# Patient Record
Sex: FEMALE | Race: ASIAN | Employment: FULL TIME | URBAN - METROPOLITAN AREA
[De-identification: names, ages, dates, MRNs, and addresses within clinical notes are randomized per-mention and may not be internally consistent; named-entity substitution may affect disease eponyms.]

---

## 2017-01-09 ENCOUNTER — TELEPHONE (OUTPATIENT)
Dept: GYNECOLOGY | Age: 47
End: 2017-01-09

## 2017-01-09 NOTE — TELEPHONE ENCOUNTER
Pt states that her disability co called and she was to return to work today, she states she has a second opinion on 1/12/17 at [facility] and then an appt with dr. Stella Pang to discuss after, we will put her return to work as of the end of the month, she will call them back and they will fax over necessary paperwork

## 2017-01-17 ENCOUNTER — TELEPHONE (OUTPATIENT)
Dept: SURGERY | Age: 47
End: 2017-01-17

## 2017-01-17 ENCOUNTER — HOSPITAL ENCOUNTER (OUTPATIENT)
Dept: INFUSION THERAPY | Age: 47
Discharge: HOME | End: 2017-01-17
Payer: COMMERCIAL

## 2017-01-17 VITALS
TEMPERATURE: 97.9 F | DIASTOLIC BLOOD PRESSURE: 75 MMHG | SYSTOLIC BLOOD PRESSURE: 112 MMHG | RESPIRATION RATE: 18 BRPM | HEART RATE: 89 BPM | OXYGEN SATURATION: 97 %

## 2017-01-17 PROCEDURE — 36591 DRAW BLOOD OFF VENOUS DEVICE: CPT

## 2017-01-17 PROCEDURE — 36415 COLL VENOUS BLD VENIPUNCTURE: CPT | Performed by: OBSTETRICS & GYNECOLOGY

## 2017-01-17 PROCEDURE — 86304 IMMUNOASSAY TUMOR CA 125: CPT | Performed by: OBSTETRICS & GYNECOLOGY

## 2017-01-17 PROCEDURE — 74011250636 HC RX REV CODE- 250/636: Performed by: OBSTETRICS & GYNECOLOGY

## 2017-01-17 PROCEDURE — 77030012965 HC NDL HUBR BBMI -A

## 2017-01-17 RX ORDER — HEPARIN 100 UNIT/ML
500 SYRINGE INTRAVENOUS AS NEEDED
Status: ACTIVE | OUTPATIENT
Start: 2017-01-17 | End: 2017-01-18

## 2017-01-17 RX ORDER — SODIUM CHLORIDE 0.9 % (FLUSH) 0.9 %
10 SYRINGE (ML) INJECTION AS NEEDED
Status: ACTIVE | OUTPATIENT
Start: 2017-01-17 | End: 2017-01-18

## 2017-01-17 RX ORDER — SODIUM CHLORIDE 9 MG/ML
10 INJECTION INTRAMUSCULAR; INTRAVENOUS; SUBCUTANEOUS AS NEEDED
Status: ACTIVE | OUTPATIENT
Start: 2017-01-17 | End: 2017-01-18

## 2017-01-17 RX ADMIN — SODIUM CHLORIDE, PRESERVATIVE FREE 500 UNITS: 5 INJECTION INTRAVENOUS at 14:01

## 2017-01-17 RX ADMIN — Medication 10 ML: at 14:02

## 2017-01-17 NOTE — PROGRESS NOTES
1345 Pt arrived ambulatory and in no distress for port flush and labs. Port accessed and flushed with positive blood return. Patient Vitals for the past 12 hrs:   Temp Pulse Resp BP SpO2   01/17/17 1348 97.9 °F (36.6 °C) 89 18 112/75 97 %     Medications:  Heparin Flush    1400 Pt discharged ambulatory and in no distress, tolerated treatment well. Next appointmt 3/14/17 at 1:30.

## 2017-01-18 LAB — CANCER AG125 SERPL-ACNC: 6 U/ML (ref 0–30)

## 2017-01-24 ENCOUNTER — OFFICE VISIT (OUTPATIENT)
Dept: GYNECOLOGY | Age: 47
End: 2017-01-24

## 2017-01-24 VITALS
WEIGHT: 143.4 LBS | BODY MASS INDEX: 28.16 KG/M2 | SYSTOLIC BLOOD PRESSURE: 98 MMHG | HEIGHT: 60 IN | DIASTOLIC BLOOD PRESSURE: 74 MMHG | HEART RATE: 96 BPM

## 2017-01-24 DIAGNOSIS — C80.0 CARCINOMATOSIS (HCC): ICD-10-CM

## 2017-01-24 DIAGNOSIS — C57.02 FALLOPIAN TUBE CANCER, CARCINOMA, LEFT (HCC): Primary | ICD-10-CM

## 2017-01-24 NOTE — PROGRESS NOTES
[facility]COLOGY  [address] [facility], [address]  [phone]  Dr. Jessica Jett, III    Tonny Bhardwaj, APRKER    Chemotherapy Office Note  Patient ID:  Name: Darcie Low  MRM: [id number]  : 1970/46 y.o. Date: 2017    Diagnosis:     ICD-10-CM ICD-9-CM    1. 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 Carcinomatosis ([address] C80.0 199.0        Problem List:   Patient Active Problem List   Diagnosis Code    Uterine fibroid D25.9    AR (allergic rhinitis) J30.9    Asthma J45.909    Family history of diabetes mellitus (DM) Z83.3    Other activity E65    Hypercholesterolemia E78.00    S/P laparotomy Z98.890    Carcinomatosis ([address] C80.0    Fallopian tube cancer, carcinoma ([address] C57.00    Chemotherapy induced diarrhea K52.1, T45.1X5A    Anemia associated with chemotherapy D64.81, T45.1X5A    Peritoneal adhesions K66.0    Pelvic adhesions N73.6    Incisional hernia K43.2    Ovarian ca ([address] C56.9       SUBJECTIVE:    Darcie Low is a 55 y.o. female who presents for followup care following staging laparotomy, BSO, extensive DEVON, 2016,        FINAL PATHOLOGIC DIAGNOSIS   1. Omentum, omentectomy:   Benign omentum with focal fibrosis   2. Small bowel, biopsy:   Benign fibrovascular tissue compatible with adhesion   3. Ovary, left oophorectomy:   Serous adenocarcinoma, high-grade   See comment   4. Ovary, right oophorectomy:   Benign ovary with hemorrhagic cyst and serosal adhesions The patient's pathology revealed       Currently she has no problems with eating, bowel movements, voiding, or their wound  Appetite is good. Eating a regular diet without difficulty. Bowel movements are regular. The patient is not having any pain. Per Stack Recent second opinions with RYAN and Indira Coker. Recommend two additional courses of IV Carbo/Taxotere---no records available.   Consideration of clinical trails discussed at consultations. Medications:     Current Outpatient Prescriptions on File Prior to Visit   Medication Sig Dispense Refill    ergocalciferol (VITAMIN D2) 50,000 unit capsule Take 1 Cap by mouth every seven (7) days. 52 Cap 0    gabapentin (NEURONTIN) 100 mg capsule Take 100 mg by mouth as needed. Indications: Restless Legs Syndrome      b complex vitamins (B COMPLEX 1) tablet Take 1 Tab by mouth daily.  glucose blood VI test strips (ACCU-CHEK REYNA PLUS TEST STRP) strip Use four times a day and PRN. 100 Strip 5    glucose blood VI test strips (ACCU-CHEK ACTIVE TEST) strip Use QID and  Strip 5    glucose blood VI test strips (ASCENSIA AUTODISC VI, ONE TOUCH ULTRA TEST VI) strip Glucose one touch test strips 100 Strip 11    Lancets misc Use as directed. 1 Each 11    CRANIAL PROSTHESIS misc Cranial Prosthesis  Diagnosis code  Cpt code 1 Each 2    metFORMIN ER (GLUCOPHAGE XR) 750 mg tablet Take 750 mg by mouth nightly.  ondansetron (ZOFRAN ODT) 4 mg disintegrating tablet TAKE 1 TABLET BY MOUTH EVERY 8 HOURS AS NEEDED FOR NAUSEA  3    oxyCODONE IR (ROXICODONE) 5 mg immediate release tablet TAKE 1 TABLET BY MOUTH EVERY 4 HOURS AS NEEDED FOR PAIN  0    oxyCODONE IR (ROXICODONE) 10 mg tab immediate release tablet Take 0.5 Tabs by mouth every four (4) hours as needed. Max Daily Amount: 30 mg. 40 Tab 0    lidocaine-prilocaine (EMLA) topical cream Apply small amount over port area and cover with band aid 1 hour before chemo 30 g 2     No current facility-administered medications on file prior to visit. Allergies: Allergies   Allergen Reactions    Tylenol [Acetaminophen] Swelling     Tylenol pm only  SLURRED SPEECH.      Past Medical History   Diagnosis Date    AR (allergic rhinitis) 4/21/2010    Asthma 4/21/2010     ALLERGY RELATED; NONE USED SINCE 2010    Cancer [facility])      OVARIAN AND FALLOPIAN TUBE    DM mellitus, gestational 4/21/2010     PRE DIABETIC    Elevated TSH 2010    Family history of diabetes mellitus (DM)     Hypercholesterolemia 2011     elevated particle number    Ill-defined condition 16     HYDRONEPHROSIS, LEFT RENAL MASS (BEING WORKED UP BY DR MC [place], UROL.)    Impaired glucose tolerance 2011    Other activity      BCG vaccine as a child    S/P laparotomy 2016    Uterine fibroid 2010     ENDOMETRIOSIS    Vitamin D deficiency 2010     Past Surgical History   Procedure Laterality Date    Hx wisdom teeth extraction       age 26    Hx other surgical       small bowel obstruction, ILEUS POSTOP    Hx appendectomy      Pr abdomen surgery proc unlisted  2016     EXP LAP FOR BOWEL OBST.  Hx vascular access       PORT A CATH L CHEST WALL    Hx  section  ,     Hx gyn  16     hysterectomy, TUBES REMOVED  (DR Nii Rodgers)     Social History     Social History    Marital status:      Spouse name: N/A    Number of children: N/A    Years of education: N/A     Occupational History    Not on file. Social History Main Topics    Smoking status: Never Smoker    Smokeless tobacco: Never Used    Alcohol use No    Drug use: No    Sexual activity: Yes     Partners: Male     Other Topics Concern    Not on file     Social History Narrative       OBJECTIVE:    Vitals:   Vitals:    17 1505   BP: 98/74   Pulse: 96   Weight: 143 lb 6.4 oz (65 kg)   Height: 5' (1.524 m)     Physical Examination: Last office examination     General:  no distress   Lungs: lungs clear to auscultation   Cardiac: Regular rate and rhythm   Abdomen: soft, bowel sounds active, non-tender  No CVA tenderness   Incision: healing well   Pelvic: Exam deferred. Rectal: not done   Extremity:   extremities normal, atraumatic, no cyanosis or edema     IMPRESSION:    Soraya Gibson is doing well postoperatively. .  She has a working diagnosis of     ICD-10-CM ICD-9-CM    1.  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 Carcinomatosis ([address] C80.0 199.0     Microscopic residual disease following IV Carbo/Taxol. Persistent disease    Medical problems:     Patient Active Problem List   Diagnosis Code    Uterine fibroid D25.9    AR (allergic rhinitis) J30.9    Asthma J45.909    Family history of diabetes mellitus (DM) Z83.3    Other activity E65    Hypercholesterolemia E78.00    S/P laparotomy Z98.890    Carcinomatosis ([address] C80.0    Fallopian tube cancer, carcinoma ([address] C57.00    Chemotherapy induced diarrhea K52.1, T45.1X5A    Anemia associated with chemotherapy D64.81, T45.1X5A    Peritoneal adhesions K66.0    Pelvic adhesions N73.6    Incisional hernia K43.2    Ovarian ca ([address] C56.9       PLAN:  Initial Consultation  The operative procedures and clinical results have been reviewed with the patient. Implications of diagnosis discussed at length. All questions answered. I have recommended salvage IV Doxil/Avastin. Rationale, risks and toxicities noted    I do not agree with the continuation of the IV Carbo/Taxol. Clinical trials are limited due to the lack of measurable disease. The patient and her  have suggestive transfer of care to [facility]. Accepted. Shari Zamora MD  1/24/2017/12:09 PM    Defined Sensitive Document    CC:   Carl Pacheco MD   No ref.  provider found

## 2017-03-06 ENCOUNTER — OFFICE VISIT (OUTPATIENT)
Dept: INTERNAL MEDICINE CLINIC | Age: 47
End: 2017-03-06

## 2017-03-06 VITALS
DIASTOLIC BLOOD PRESSURE: 80 MMHG | SYSTOLIC BLOOD PRESSURE: 102 MMHG | OXYGEN SATURATION: 99 % | HEART RATE: 100 BPM | HEIGHT: 60 IN | TEMPERATURE: 98.2 F | WEIGHT: 143 LBS | RESPIRATION RATE: 16 BRPM | BODY MASS INDEX: 28.07 KG/M2

## 2017-03-06 DIAGNOSIS — K64.4 EXTERNAL HEMORRHOIDS: ICD-10-CM

## 2017-03-06 DIAGNOSIS — L08.9 SKIN INFECTION: ICD-10-CM

## 2017-03-06 DIAGNOSIS — T23.171A: Primary | ICD-10-CM

## 2017-03-06 RX ORDER — AMOXICILLIN 875 MG/1
875 TABLET, FILM COATED ORAL 2 TIMES DAILY
Qty: 14 TAB | Refills: 0 | Status: SHIPPED | OUTPATIENT
Start: 2017-03-06 | End: 2017-08-25 | Stop reason: ALTCHOICE

## 2017-03-06 NOTE — MR AVS SNAPSHOT
Visit Information Date & Time Provider Department Dept. Phone Encounter #  
 3/6/2017  2:20 PM Jadiel San NP [facility] Internal Medicine [phone] Upcoming Health Maintenance Date Due Pneumococcal 19-64 Highest Risk (1 of 3 - PCV13) 5/30/1989 PAP AKA CERVICAL CYTOLOGY 5/30/1991 DTaP/Tdap/Td series (2 - Td) 9/7/2020 Allergies as of 3/6/2017  Review Complete On: 3/6/2017 By: Jadiel San NP Severity Noted Reaction Type Reactions Tylenol [Acetaminophen]  04/25/2016    Swelling Tylenol pm only SLURRED SPEECH. Current Immunizations  Reviewed on 1/17/2017 Name Date Hepatitis B Vaccine 9/21/2010 PPD 1/4/2012 TDAP Vaccine 9/7/2010 Not reviewed this visit You Were Diagnosed With   
  
 Codes Comments Superficial burn of wrist, right, initial encounter    -  Primary ICD-10-CM: T23.171A ICD-9-CM: 944.17 Skin infection     ICD-10-CM: L08.9 ICD-9-CM: 686.9 External hemorrhoids     ICD-10-CM: K64.4 ICD-9-CM: 461. 3 Vitals BP Pulse Temp Resp Height(growth percentile) Weight(growth percentile) 102/80 (BP 1 Location: Left arm, BP Patient Position: Sitting) 100 98.2 °F (36.8 °C) (Oral) 16 5' (1.524 m) 143 lb (64.9 kg) LMP SpO2 BMI OB Status Smoking Status 07/05/2013 99% 27.93 kg/m2 Hysterectomy Never Smoker BMI and BSA Data Body Mass Index Body Surface Area  
 27.93 kg/m 2 1.66 m 2 Preferred Pharmacy Pharmacy Name Phone [id number] [place] [address] [phone] Your Updated Medication List  
  
   
This list is accurate as of: 3/6/17  3:20 PM.  Always use your most recent med list.  
  
  
  
  
 amoxicillin 875 mg tablet Commonly known as:  AMOXIL Take 1 Tab by mouth two (2) times a day. B COMPLEX 1 tablet Generic drug:  b complex vitamins Take 1 Tab by mouth daily. CRANIAL PROSTHESIS Misc Cranial Prosthesis Diagnosis code Cpt code  
  
 ergocalciferol 50,000 unit capsule Commonly known as:  VITAMIN D2 Take 1 Cap by mouth every seven (7) days. * glucose blood VI test strips strip Commonly known as:  ASCENSIA AUTODISC VI, ONE TOUCH ULTRA TEST VI  
Glucose one touch test strips * glucose blood VI test strips strip Commonly known as:  ACCU-CHEK ACTIVE TEST Use QID and PRN  
  
 * glucose blood VI test strips strip Commonly known as:  ACCU-CHEK REYNA PLUS TEST STRP Use four times a day and PRN. Lancets Misc Use as directed. lidocaine-prilocaine topical cream  
Commonly known as:  EMLA Apply small amount over port area and cover with band aid 1 hour before chemo NEURONTIN 100 mg capsule Generic drug:  gabapentin Take 100 mg by mouth as needed. Indications: Restless Legs Syndrome * Notice: This list has 3 medication(s) that are the same as other medications prescribed for you. Read the directions carefully, and ask your doctor or other care provider to review them with you. Prescriptions Sent to Pharmacy Refills  
 amoxicillin (AMOXIL) 875 mg tablet 0 Sig: Take 1 Tab by mouth two (2) times a day. Class: Normal  
 Pharmacy: [id number] [place], [address] [place] [address] #: [phone] Route: Oral  
  
To-Do List   
 03/14/2017 1:30 PM  
  Appointment with Binta Wilkinson at [facility] ([phone])  
  
 05/09/2017 1:30 PM  
  Appointment with Binta Wilkinson at [facility] ([phone]) Patient Instructions Common at-home treatments for hemorrhoids Type of medicine Examples Notes Stool softener Docusate sodium (sample brand names: Colace, Docu, Stool Softener) Softens bowel movements Helps avoid straining while sitting on toilet Bulk-forming laxatives and fiber supplements Methylcellulose (sample brand names: Citrucel, Soluble Fiber Therapy) Polycarbophil (sample brand names: Jaden Bengali Fiber) Psyllium (sample brand names: Pavithra Jn) Wheat dextrin (sample brand name: Benefiber) Prevent hard dry stools which make hemorrhoids worse Might reduce bleeding and other hemorrhoid symptoms Needs to be taken with plenty of water (8 glasses of water a day) Your doctor might suggest starting with a small dose and then increasing over time Pain relief Pramoxine rectal foam, ointment, or wipes (sample brand names: Proctofoam, Pramox) Can help with pain and itching Area must be gently cleaned and allowed to dry before using Medicines to dry or protect skin Witch hazel (sample brand names: Tucks, Preparation H pads, Preparation H wipes) Zinc oxide topical paste (sample brand names: Renetta's Butt Paste, Desitin) May dry or tighten skin around the anus Zinc oxide also protects skin from irritation Area must be gently cleaned and allowed to dry before using Can apply after a sitz bath (soaking in warm, shallow water) Witch hazel wipes or unscented baby wipes can be used to clean the anus after a bowel movement Steroid cream Hydrocortisone rectal cream (sample brand name: Preparation H hydrocortisone) Reduces swelling, and pain caused by hemorrhoids Do not use for longer than a week Do not use at all if you are pregnant unless your doctor or nurse tells you to Medicines to shrink hemorrhoids Phenylephrine ointment or suppository (sample brand names: Preparation H, Rectacaine) Phenylephrine shrinks swollen hemorrhoids, and relieves itching and discomfort for a few hours Area must be gently cleaned and allowed to dry before applying Numbing ointments Benzocaine rectal ointment (sample brand name: Americaine) Dibucaine rectal ointment (sample brand name: Susan Bloch) Lidocaine rectal cream (sample brand name: RectiCare) Benzocaine, dibucaine, and lidocaine can help with pain and itching, but should not be used without talking to a doctor first. They should only be used once in a while, in small amounts. · This table lists some examples of over-the-counter treatments for hemorrhoids. There are many more brand-name and generic versions available, too. Read all labels to make sure you're not using too much of one ingredient. · Do not use over-the-counter treatments for more than one week without speaking to your doctor. They can damage your skin. · Follow instructions carefully  for example, it's important to clean and dry your skin before using creams or ointments. You can use an unscented baby wipe to clean your anus after a bowel movement. · If you have rectal bleeding, or if your bowel movements look like tar, see your doctor or nurse. These problems could be caused by something other than hemorrhoids. You should also see your doctor or nurse if your hemorrhoid symptoms still bother you after you have tried taking care of them yourself. Introducing [facility]! Dear Raza Hopkins: 
Thank you for requesting a [employer] [employer] account. Our records indicate that you already have an active [employer] account. You can access your account anytime at [email or web address] Did you know that you can access your hospital and ER discharge instructions at any time in [employer]? You can also review all of your test results from your hospital stay or ER visit. Additional Information If you have questions, please visit the Frequently Asked Questions section of the [employer] website at [email or web address] Remember, [facility] is NOT to be used for urgent needs. For medical emergencies, dial 911. Now available from your iPhone and Android! Please provide this summary of care documentation to your next provider. Your primary care clinician is listed as Jaime Carpio.  If you have any questions after today's visit, please call [phone].

## 2017-03-06 NOTE — PROGRESS NOTES
Chief Complaint   Patient presents with    Fever     was 89. patient went on the 22nd for last round of chemo. states that she has places on both hands and on the feet. nurse told patient to use udder cream and patient is not seeing Dr Emiliano Ng, is going to [address] [place] [address].

## 2017-03-06 NOTE — PROGRESS NOTES
Medina Jarvis is a  55 y.o. female presents for visit. Chief Complaint   Patient presents with    Fever     here for burn on right wrist.       HPI   Patient new to me. Recent chemotherapy experiencing side effects including rash on hands/feet and diarrhea that is being managed by oncologist. PMhx of rectal itching and external hemmorhoid. Reports rectal itching after diarrhea this time. Worse  No bleeding, no bloody stool. Presents with burn on right wrist that was sustained 3 weeks ago while frying with oil. [place] Has not healed yet. Tender to touch and scant clear drainage. Fever of 10.2 last night. Treating with neosporin but ineffective.      ROS   Per HPI    Patient Active Problem List    Diagnosis Date Noted    Peritoneal adhesions 11/22/2016    Pelvic adhesions 11/22/2016    Incisional hernia 11/22/2016    Ovarian ca ([address] 11/22/2016    Chemotherapy induced diarrhea 09/20/2016    Anemia associated with chemotherapy 09/20/2016    Fallopian tube cancer, carcinoma ([address] 08/09/2016    Carcinomatosis ([address] 06/24/2016    S/P laparotomy 05/23/2016    Other activity     Family history of diabetes mellitus (DM)     Hypercholesterolemia 11/01/2011    Uterine fibroid 04/21/2010    AR (allergic rhinitis) 04/21/2010    Asthma 04/21/2010     Past Medical History:   Diagnosis Date    AR (allergic rhinitis) 4/21/2010    Asthma 4/21/2010    ALLERGY RELATED; NONE USED SINCE 2010    Cancer [facility])     OVARIAN AND FALLOPIAN TUBE    DM mellitus, gestational 4/21/2010    PRE DIABETIC    Elevated TSH 4/21/2010    Family history of diabetes mellitus (DM)     Hypercholesterolemia 11/2011    elevated particle number    Ill-defined condition 6/16/16    HYDRONEPHROSIS, LEFT RENAL MASS (BEING WORKED UP BY DR MC Hubbard, UROL.)    Impaired glucose tolerance 12/19/2011    Other activity     BCG vaccine as a child    S/P laparotomy 5/23/2016    Uterine fibroid 4/21/2010    ENDOMETRIOSIS    Vitamin D deficiency 2010      Past Surgical History:   Procedure Laterality Date    ABDOMEN SURGERY PROC UNLISTED  2016    EXP LAP FOR BOWEL OBST.  HX APPENDECTOMY      HX  SECTION  ,     HX GYN  16    hysterectomy, TUBES REMOVED  ([facility])    HX OTHER SURGICAL      small bowel obstruction, ILEUS POSTOP    HX VASCULAR ACCESS      PORT A CATH L CHEST WALL    HX WISDOM TEETH EXTRACTION      age 28        Social History   Substance Use Topics    Smoking status: Never Smoker    Smokeless tobacco: Never Used    Alcohol use No      Social History     Social History Narrative     Family History   Problem Relation Age of Onset    Diabetes Mother     Diabetes Father     Hypertension Father     Diabetes Maternal Grandmother     Diabetes Maternal Grandfather     No Known Problems Sister     Diabetes Paternal Uncle     Anesth Problems Neg Hx       Prior to Admission medications    Medication Sig Start Date End Date Taking? Authorizing Provider   amoxicillin (AMOXIL) 875 mg tablet Take 1 Tab by mouth two (2) times a day. 3/6/17  Yes Rene Espino NP   ergocalciferol (VITAMIN D2) 50,000 unit capsule Take 1 Cap by mouth every seven (7) days. 16  Yes Jose E Croft NP   gabapentin (NEURONTIN) 100 mg capsule Take 100 mg by mouth as needed. Indications: Restless Legs Syndrome   Yes Historical Provider   b complex vitamins (B COMPLEX 1) tablet Take 1 Tab by mouth daily. Yes Historical Provider   glucose blood VI test strips (ACCU-CHEK REYNA PLUS TEST STRP) strip Use four times a day and PRN. 16  Yes Jen Huffman MD   glucose blood VI test strips (ACCU-CHEK ACTIVE TEST) strip Use QID and PRN 16  Yes Jen Huffman MD   glucose blood VI test strips (ASCENSIA AUTODISC VI, ONE TOUCH ULTRA TEST VI) strip Glucose one touch test strips 16  Yes Jen Huffman MD   Lancets misc Use as directed.  16  Yes Jen Huffman MD   CRANIAL PROSTHESIS [facility] Cranial Prosthesis  Diagnosis code  Cpt code 7/14/16  Yes Vidal Cronin III, MD   lidocaine-prilocaine (EMLA) topical cream Apply small amount over port area and cover with band aid 1 hour before chemo 6/24/16  Yes Elder Ochoa MD      Allergies   Allergen Reactions    Tylenol [Acetaminophen] Swelling     Tylenol pm only  SLURRED SPEECH. Visit Vitals    /80 (BP 1 Location: Left arm, BP Patient Position: Sitting)    Pulse 100    Temp 98.2 °F (36.8 °C) (Oral)    Resp 16    Ht 5' (1.524 m)    Wt 143 lb (64.9 kg)    LMP 07/05/2013    SpO2 99%    BMI 27.93 kg/m2     Physical Exam   Constitutional: She is oriented to person, place, and time. No distress. Thin built    HENT:   Right Ear: External ear normal.   Left Ear: External ear normal.   Mouth/Throat: Oropharynx is clear and moist.   Eyes: Conjunctivae and EOM are normal.   Neck: Normal range of motion. Neck supple. Cardiovascular: Normal rate and regular rhythm. Pulmonary/Chest: Effort normal and breath sounds normal.   Abdominal: Soft. Bowel sounds are normal.   Neurological: She is alert and oriented to person, place, and time. Skin: Skin is dry and intact. There is erythema (patches or erythema noted to both palms related to side effect from chemo. ). Psychiatric: She has a normal mood and affect. Her behavior is normal.   Nursing note and vitals reviewed. This note will not be viewable in [address] ASSESSMENT AND PLAN:      ICD-10-CM ICD-9-CM    1. Superficial burn of wrist, right, initial encounter T23.171A 944.17    2. Skin infection L08.9 686.9 amoxicillin (AMOXIL) 875 mg tablet   3. External hemorrhoids K64.4 455. 3 Reviewed OTC treatment, prep H and annusol. Follow-up Disposition:  Return if symptoms worsen or fail to improve.         Disclaimer:  Advised her to call back or return to office if symptoms worsen/change/persist.  Discussed expected course/resolution/complications of diagnosis in detail with patient. Medication risks/benefits/alternatives discussed with patient. She was given an after visit summary which includes diagnoses, current medications, & vitals. Discussed patient instructions and advised to read to all patient instructions regarding care. She expressed understanding with the diagnosis and plan.

## 2017-03-06 NOTE — PATIENT INSTRUCTIONS
Common at-home treatments for hemorrhoids  Type of medicine Examples Notes   Stool softener Docusate sodium (sample brand names: Colace, Docu, Stool Softener) Softens bowel movements  Helps avoid straining while sitting on toilet   Bulk-forming laxatives and fiber supplements Methylcellulose (sample brand names: Citrucel, Soluble Fiber Therapy)  Polycarbophil (sample brand names: Carolyn Dials Fiber)  Psyllium (sample brand names: Metamucil, Konsyl)  Wheat dextrin (sample brand name: Benefiber) Prevent hard dry stools which make hemorrhoids worse  Might reduce bleeding and other hemorrhoid symptoms  Needs to be taken with plenty of water (8 glasses of water a day)  Your doctor might suggest starting with a small dose and then increasing over time   Pain relief Pramoxine rectal foam, ointment, or wipes (sample brand names: Proctofoam, Pramox) Can help with pain and itching  Area must be gently cleaned and allowed to dry before using   Medicines to dry or protect skin Witch hazel (sample brand names: Tucks, Preparation H pads, Preparation H wipes)  Zinc oxide topical paste (sample brand names: Renetta's Butt Paste, Desitin) May dry or tighten skin around the anus  Zinc oxide also protects skin from irritation  Area must be gently cleaned and allowed to dry before using  Can apply after a sitz bath (soaking in warm, shallow water)  Witch hazel wipes or unscented baby wipes can be used to clean the anus after a bowel movement   Steroid cream Hydrocortisone rectal cream (sample brand name: Preparation H hydrocortisone) Reduces swelling, and pain caused by hemorrhoids  Do not use for longer than a week  Do not use at all if you are pregnant unless your doctor or nurse tells you to   Medicines to shrink hemorrhoids Phenylephrine ointment or suppository (sample brand names: Preparation H, Rectacaine) Phenylephrine shrinks swollen hemorrhoids, and relieves itching and discomfort for a few hours  Area must be gently cleaned and allowed to dry before applying   Numbing ointments Benzocaine rectal ointment (sample brand name: Americaine)  Dibucaine rectal ointment (sample brand name: Nupercainal)  Lidocaine rectal cream (sample brand name: RectiCare) Benzocaine, dibucaine, and lidocaine can help with pain and itching, but should not be used without talking to a doctor first. They should only be used once in a while, in small amounts. · This table lists some examples of over-the-counter treatments for hemorrhoids. There are many more brand-name and generic versions available, too. Read all labels to make sure you're not using too much of one ingredient. · Do not use over-the-counter treatments for more than one week without speaking to your doctor. They can damage your skin. · Follow instructions carefully - for example, it's important to clean and dry your skin before using creams or ointments. You can use an unscented baby wipe to clean your anus after a bowel movement. · If you have rectal bleeding, or if your bowel movements look like tar, see your doctor or nurse. These problems could be caused by something other than hemorrhoids. You should also see your doctor or nurse if your hemorrhoid symptoms still bother you after you have tried taking care of them yourself.

## 2017-03-14 ENCOUNTER — DOCUMENTATION ONLY (OUTPATIENT)
Dept: GYNECOLOGY | Age: 47
End: 2017-03-14

## 2017-03-14 ENCOUNTER — APPOINTMENT (OUTPATIENT)
Dept: INFUSION THERAPY | Age: 47
End: 2017-03-14

## 2017-03-14 NOTE — PROGRESS NOTES
Port flush/lab appts for 3/14/17, and 5/9/17 cancelled with Joe Vila at [facility] d/t pt has transferred her care to [facility].

## 2017-05-09 ENCOUNTER — APPOINTMENT (OUTPATIENT)
Dept: INFUSION THERAPY | Age: 47
End: 2017-05-09

## 2017-07-31 RX ORDER — METFORMIN HYDROCHLORIDE 500 MG/1
500 TABLET, EXTENDED RELEASE ORAL
Qty: 90 TAB | Refills: 3 | Status: SHIPPED | OUTPATIENT
Start: 2017-07-31 | End: 2017-08-25 | Stop reason: DRUGHIGH

## 2017-08-22 DIAGNOSIS — Z00.00 PHYSICAL EXAM: Primary | ICD-10-CM

## 2017-08-23 LAB
25(OH)D3+25(OH)D2 SERPL-MCNC: 33.9 NG/ML (ref 30–100)
ALBUMIN SERPL-MCNC: 4 G/DL (ref 3.5–5.5)
ALBUMIN/GLOB SERPL: 1.2 {RATIO} (ref 1.2–2.2)
ALP SERPL-CCNC: 70 IU/L (ref 39–117)
ALT SERPL-CCNC: 89 IU/L (ref 0–32)
AST SERPL-CCNC: 55 IU/L (ref 0–40)
BILIRUB SERPL-MCNC: 0.5 MG/DL (ref 0–1.2)
BUN SERPL-MCNC: 8 MG/DL (ref 6–24)
BUN/CREAT SERPL: 13 (ref 9–23)
CALCIUM SERPL-MCNC: 9 MG/DL (ref 8.7–10.2)
CHLORIDE SERPL-SCNC: 103 MMOL/L (ref 96–106)
CHOLEST SERPL-MCNC: 127 MG/DL (ref 100–199)
CO2 SERPL-SCNC: 23 MMOL/L (ref 18–29)
CREAT SERPL-MCNC: 0.6 MG/DL (ref 0.57–1)
ERYTHROCYTE [DISTWIDTH] IN BLOOD BY AUTOMATED COUNT: 14.1 % (ref 12.3–15.4)
EST. AVERAGE GLUCOSE BLD GHB EST-MCNC: 131 MG/DL
GLOBULIN SER CALC-MCNC: 3.4 G/DL (ref 1.5–4.5)
GLUCOSE SERPL-MCNC: 116 MG/DL (ref 65–99)
HBA1C MFR BLD: 6.2 % (ref 4.8–5.6)
HCT VFR BLD AUTO: 34.6 % (ref 34–46.6)
HDLC SERPL-MCNC: 31 MG/DL
HGB BLD-MCNC: 11.4 G/DL (ref 11.1–15.9)
INTERPRETATION, 910389: NORMAL
LDLC SERPL CALC-MCNC: 71 MG/DL (ref 0–99)
MCH RBC QN AUTO: 30.9 PG (ref 26.6–33)
MCHC RBC AUTO-ENTMCNC: 32.9 G/DL (ref 31.5–35.7)
MCV RBC AUTO: 94 FL (ref 79–97)
PLATELET # BLD AUTO: 161 X10E3/UL (ref 150–379)
POTASSIUM SERPL-SCNC: 4.1 MMOL/L (ref 3.5–5.2)
PROT SERPL-MCNC: 7.4 G/DL (ref 6–8.5)
RBC # BLD AUTO: 3.69 X10E6/UL (ref 3.77–5.28)
SODIUM SERPL-SCNC: 141 MMOL/L (ref 134–144)
TRIGL SERPL-MCNC: 124 MG/DL (ref 0–149)
TSH SERPL DL<=0.005 MIU/L-ACNC: 0.82 UIU/ML (ref 0.45–4.5)
VLDLC SERPL CALC-MCNC: 25 MG/DL (ref 5–40)
WBC # BLD AUTO: 5 X10E3/UL (ref 3.4–10.8)

## 2017-08-25 ENCOUNTER — OFFICE VISIT (OUTPATIENT)
Dept: INTERNAL MEDICINE CLINIC | Age: 47
End: 2017-08-25

## 2017-08-25 VITALS
HEIGHT: 60 IN | DIASTOLIC BLOOD PRESSURE: 68 MMHG | SYSTOLIC BLOOD PRESSURE: 118 MMHG | TEMPERATURE: 97.8 F | HEART RATE: 85 BPM | OXYGEN SATURATION: 97 % | WEIGHT: 139.4 LBS | BODY MASS INDEX: 27.37 KG/M2

## 2017-08-25 DIAGNOSIS — Z12.39 BREAST CANCER SCREENING: ICD-10-CM

## 2017-08-25 DIAGNOSIS — Z85.43 HISTORY OF OVARIAN CANCER: ICD-10-CM

## 2017-08-25 DIAGNOSIS — G62.0 CHEMOTHERAPY-INDUCED PERIPHERAL NEUROPATHY (HCC): ICD-10-CM

## 2017-08-25 DIAGNOSIS — Z00.00 PHYSICAL EXAM: Primary | ICD-10-CM

## 2017-08-25 DIAGNOSIS — E11.9 CONTROLLED TYPE 2 DIABETES MELLITUS WITHOUT COMPLICATION, WITHOUT LONG-TERM CURRENT USE OF INSULIN (HCC): ICD-10-CM

## 2017-08-25 DIAGNOSIS — L50.2 HIVES DUE TO COLD EXPOSURE: ICD-10-CM

## 2017-08-25 DIAGNOSIS — T45.1X5A CHEMOTHERAPY-INDUCED PERIPHERAL NEUROPATHY (HCC): ICD-10-CM

## 2017-08-25 RX ORDER — METFORMIN HYDROCHLORIDE 750 MG/1
750 TABLET, EXTENDED RELEASE ORAL DAILY
Qty: 90 TAB | Refills: 0 | Status: SHIPPED | OUTPATIENT
Start: 2017-08-25 | End: 2017-10-13 | Stop reason: SDUPTHER

## 2017-08-25 RX ORDER — GABAPENTIN 300 MG/1
300 CAPSULE ORAL
Qty: 90 CAP | Refills: 0 | Status: SHIPPED | OUTPATIENT
Start: 2017-08-25 | End: 2017-11-07 | Stop reason: SDUPTHER

## 2017-08-25 NOTE — PROGRESS NOTES
Written by Tina Vergara, as dictated by Dr. Pedro Pablo Ricardo MD.    Francheska Farnsworth is a 52 y.o. female. HPI  The patient comes in today for a complete physical examination. She finished chemotherapy on 02/28 for ovarian cancer, and follows with Dr. Carli Vargas (onco) at [facility] every 3 months, referred by Dr. Trupti Santacruz (gynecology). She had a full hysterectomy in 02/2016 so she does not get pap smears. Labs were drawn on 08/22. Her HDL was low at 31 but all other cholesterols were within normal limits. Her fasting BS was elevated at 116 and her AST and ALT were elevated 55 and 89. Her HA1c was 6.2%, up from 5.1% in 11/2016. Her last mammogram was in 2016, which was normal. She has not gone for a repeat mammogram this year yet. She takes gabapentin for leg cramps, which are worst at night. She is vegetarian and takes vitamin B12 supplements. She is compliant on metformin  mg and she does follow a low-carb diet. She denies chest tightness or SOB. She has been experiencing localized hive outbreaks nearly every day, for which she has been taking Benadryl and applying coconut oil, which have not helped. The outbreaks occur when she is cold (opens the freezer) and sometimes with food, and her lips do occasionally swell as well.     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 52 Cap 0    b complex vitamins (B COMPLEX 1) tablet Take 1 Tab by mouth daily.  glucose blood VI test strips (ACCU-CHEK REYNA PLUS TEST STRP) strip Use four times a day and PRN. 100 Strip 5    glucose blood VI test strips (ACCU-CHEK ACTIVE TEST) strip Use QID and  Strip 5    glucose blood VI test strips (ASCENSIA AUTODISC VI, ONE TOUCH ULTRA TEST VI) strip Glucose one touch test strips 100 Strip 11    Lancets misc Use as directed. 1 Each 11    CRANIAL PROSTHESIS misc Cranial Prosthesis  Diagnosis code  Cpt code (Patient not taking: Reported on 8/25/2017) 1 Each 2    lidocaine-prilocaine (EMLA) topical cream Apply small amount over port area and cover with band aid 1 hour before chemo (Patient not taking: Reported on 8/25/2017) 30 g 2     No current facility-administered medications on file prior to visit. Allergies   Allergen Reactions    Tylenol [Acetaminophen] Swelling     Tylenol pm only  SLURRED SPEECH. Past Medical History:   Diagnosis Date    AR (allergic rhinitis) 4/21/2010    Asthma 4/21/2010    ALLERGY RELATED; NONE USED SINCE 2010    Cancer [facility])     OVARIAN AND FALLOPIAN TUBE    DM mellitus, gestational 4/21/2010    PRE DIABETIC    Elevated TSH 4/21/2010    Family history of diabetes mellitus (DM)     Hypercholesterolemia 11/2011    elevated particle number    Ill-defined condition 6/16/16    HYDRONEPHROSIS, LEFT RENAL MASS (BEING WORKED UP BY DR MC Flynn, UROL.)    Impaired glucose tolerance 12/19/2011    Other activity     BCG vaccine as a child    S/P laparotomy 5/23/2016    Uterine fibroid 4/21/2010    ENDOMETRIOSIS    Vitamin D deficiency 5/18/2010       Past Surgical History:   Procedure Laterality Date    ABDOMEN SURGERY PROC UNLISTED  05/03/2016    EXP LAP FOR BOWEL OBST.     HX APPENDECTOMY      [address] HX GYN  2/24/16 hysterectomy, TUBES REMOVED  ([facility])    HX OTHER SURGICAL  5/16    small bowel obstruction, ILEUS POSTOP    HX VASCULAR ACCESS      PORT A CATH L CHEST WALL    HX WISDOM TEETH EXTRACTION      age 28       Family History   Problem Relation Age of Onset    Diabetes Mother     Diabetes Father     Hypertension Father     Diabetes Maternal Grandmother     Diabetes Maternal Grandfather     No Known Problems Sister     Diabetes Paternal Uncle     Anesth Problems Neg Hx        Social History     Social History    Marital status:      Spouse name: N/A    Number of children: N/A    Years of education: N/A     Occupational History    Not on file.      Social History Main Topics    Smoking status: Never Smoker    Smokeless tobacco: Never Used    Alcohol use No    Drug use: No    Sexual activity: Yes     Partners: Male     Other Topics Concern    Not on file     Social History Narrative       Orders Only on 08/22/2017   Component Date Value Ref Range Status    Cholesterol, total 08/22/2017 127  100 - 199 mg/dL Final    Triglyceride 08/22/2017 124  0 - 149 mg/dL Final    HDL Cholesterol 08/22/2017 31* >39 mg/dL Final    VLDL, calculated 08/22/2017 25  5 - 40 mg/dL Final    LDL, calculated 08/22/2017 71  0 - 99 mg/dL Final    WBC 08/22/2017 5.0  3.4 - 10.8 x10E3/uL Final    RBC 08/22/2017 3.69* 3.77 - 5.28 x10E6/uL Final    HGB 08/22/2017 11.4  11.1 - 15.9 g/dL Final    HCT 08/22/2017 34.6  34.0 - 46.6 % Final    MCV 08/22/2017 94  79 - 97 fL Final    MCH 08/22/2017 30.9  26.6 - 33.0 pg Final    MCHC 08/22/2017 32.9  31.5 - 35.7 g/dL Final    RDW 08/22/2017 14.1  12.3 - 15.4 % Final    PLATELET 23/10/8805 829  150 - 379 x10E3/uL Final    Glucose 08/22/2017 116* 65 - 99 mg/dL Final    BUN 08/22/2017 8  6 - 24 mg/dL Final    Creatinine 08/22/2017 0.60  0.57 - 1.00 mg/dL Final    GFR est non-AA 08/22/2017 109  >59 mL/min/1.73 Final    GFR est AA 08/22/2017 126  >59 mL/min/1.73 Final    BUN/Creatinine ratio 08/22/2017 13  9 - 23 Final    Sodium 08/22/2017 141  134 - 144 mmol/L Final    Potassium 08/22/2017 4.1  3.5 - 5.2 mmol/L Final    Chloride 08/22/2017 103  96 - 106 mmol/L Final    CO2 08/22/2017 23  18 - 29 mmol/L Final    Calcium 08/22/2017 9.0  8.7 - 10.2 mg/dL Final    Protein, total 08/22/2017 7.4  6.0 - 8.5 g/dL Final    Albumin 08/22/2017 4.0  3.5 - 5.5 g/dL Final    GLOBULIN, TOTAL 08/22/2017 3.4  1.5 - 4.5 g/dL Final    A-G Ratio 08/22/2017 1.2  1.2 - 2.2 Final    Bilirubin, total 08/22/2017 0.5  0.0 - 1.2 mg/dL Final    Alk. phosphatase 08/22/2017 70  39 - 117 IU/L Final    AST (SGOT) 08/22/2017 55* 0 - 40 IU/L Final    ALT (SGPT) 08/22/2017 89* 0 - 32 IU/L Final    VITAMIN D, 25-HYDROXY 08/22/2017 33.9  30.0 - 100.0 ng/mL Final    Comment: Vitamin D deficiency has been defined by the [facility] practice guideline as a  level of serum 25-OH vitamin D less than 20 ng/mL (1,2). The Endocrine Society went on to further define vitamin D  insufficiency as a level between 21 and 29 ng/mL (2). 1. IOM (Rex of Medicine). 2010. Dietary reference     intakes for calcium and D. [facility]: The     [employer]. 2. Konstantin MF, Afsaneh NC, Nuno CALDERON, et al.     Evaluation, treatment, and prevention of vitamin D     deficiency: an Endocrine Society clinical practice     guideline. JCEM. 2011 Jul; 96(7):1911-30.  TSH 08/22/2017 0.819  0.450 - 4.500 uIU/mL Final    Hemoglobin A1c 08/22/2017 6.2* 4.8 - 5.6 % Final    Comment:          Pre-diabetes: 5.7 - 6.4           Diabetes: >6.4           Glycemic control for adults with diabetes: <7.0      Estimated average glucose 08/22/2017 131  mg/dL Final    INTERPRETATION 08/22/2017 Note   Final    Supplement report is available. Review of Systems   Constitutional: Negative for malaise/fatigue. HENT: Negative for congestion.     Eyes: Negative for blurred vision and pain. Respiratory: Negative for cough and shortness of breath. Cardiovascular: Negative for chest pain and palpitations. Gastrointestinal: Negative for abdominal pain and heartburn. Genitourinary: Negative for frequency and urgency. Musculoskeletal: Negative for joint pain and myalgias. Skin: Positive for rash. Neurological: Negative for dizziness, tingling, sensory change, weakness and headaches. Psychiatric/Behavioral: Negative for depression, memory loss and substance abuse. Visit Vitals    /68 (BP 1 Location: Right arm, BP Patient Position: Sitting)    Pulse 85    Temp 97.8 °F (36.6 °C) (Oral)    Ht 5' (1.524 m)    Wt 139 lb 6.4 oz (63.2 kg)    LMP 07/05/2013    SpO2 97%    BMI 27.22 kg/m2       Physical Exam   Constitutional: She is oriented to person, place, and time. She appears well-developed and well-nourished. No distress. HENT:   Right Ear: External ear normal.   Left Ear: External ear normal.   Eyes: Conjunctivae and EOM are normal. Right eye exhibits no discharge. Left eye exhibits no discharge. Neck: Normal range of motion. Neck supple. Cardiovascular: Normal rate and regular rhythm. Pulses:       Dorsalis pedis pulses are 2+ on the right side, and 2+ on the left side. Pulmonary/Chest: Effort normal and breath sounds normal. She has no wheezes. Abdominal: Soft. Bowel sounds are normal. There is no tenderness. Lymphadenopathy:     She has no cervical adenopathy. Neurological: She is alert and oriented to person, place, and time. Skin: She is not diaphoretic. Psychiatric: She has a normal mood and affect. Her behavior is normal.   Nursing note and vitals reviewed. ASSESSMENT and PLAN    ICD-10-CM ICD-9-CM    1. Physical exam Z00.00 V70.9 Complete physical exam done. Basic labs reviewed with pt.   2. History of ovarian cancer Z85.43 V10.43 [place] [facility]    3. Chemotherapy-induced peripheral neuropathy ([facility]) G62.0 357.7 gabapentin (NEURONTIN) 300 mg capsule sent to pharmacy    Gabapentin refilled. She can take one 300 mg dose per day. T45.1X5A E933.1    4. Controlled type 2 diabetes mellitus without complication, without long-term current use of insulin (HCC) E11.9 250.00 metFORMIN ER (GLUCOPHAGE XR) 750 mg tablet sent to pharmacy    Metformin refilled. If she loses weight and she notices her BS dropping, she can cut down on metformin. 5. Breast cancer screening Z12.39 V76.10 LINDY MAMMO BI SCREENING INCL CAD    Referred for mammogram.   6. Hives due to cold exposure L50.2 708. 2 She should stay active and take Zyrtec and keep monitoring it as the weather gets colder. This plan was reviewed with the patient and patient agrees. All questions were answered. This scribe documentation was reviewed by me and accurately reflects the examination and decisions made by me.

## 2017-08-25 NOTE — PROGRESS NOTES
Identified pt with two pt identifiers(name and ). Reviewed record in preparation for visit and have obtained necessary documentation. Chief Complaint   Patient presents with    Physical        Health Maintenance Due   Topic    Pneumococcal 19-64 Highest Risk (1 of 3 - PCV13)    PAP AKA CERVICAL CYTOLOGY     INFLUENZA AGE 9 TO ADULT      HM reviewed w/patient  Patient reports pap was done last year. Coordination of Care Questionnaire:  :   1) Have you been to an emergency room, urgent care clinic since your last visit? no   Hospitalized since your last visit? no             2. Have seen or consulted any other health care provider since your last visit? NO  If yes, where when, and reason for visit? 3) Do you have an Advanced Directive/ Living Will in place? No  If yes, do we have a copy on file NO  If no, would you like information NO    Patient is accompanied by self.

## 2017-09-08 ENCOUNTER — HOSPITAL ENCOUNTER (OUTPATIENT)
Dept: MAMMOGRAPHY | Age: 47
Discharge: HOME | End: 2017-09-08

## 2017-09-08 DIAGNOSIS — Z12.39 BREAST CANCER SCREENING: ICD-10-CM

## 2017-09-08 PROCEDURE — 77067 SCR MAMMO BI INCL CAD: CPT

## 2017-09-08 RX ORDER — ERGOCALCIFEROL 1.25 MG/1
50000 CAPSULE ORAL
Qty: 52 CAP | Refills: 0 | Status: SHIPPED | OUTPATIENT
Start: 2017-09-08 | End: 2018-01-26 | Stop reason: SDUPTHER

## 2017-10-13 DIAGNOSIS — E11.9 CONTROLLED TYPE 2 DIABETES MELLITUS WITHOUT COMPLICATION, WITHOUT LONG-TERM CURRENT USE OF INSULIN (HCC): ICD-10-CM

## 2017-10-13 RX ORDER — METFORMIN HYDROCHLORIDE 750 MG/1
TABLET, EXTENDED RELEASE ORAL
Qty: 90 TAB | Refills: 0 | Status: SHIPPED | OUTPATIENT
Start: 2017-10-13 | End: 2017-12-20 | Stop reason: SDUPTHER

## 2017-10-26 NOTE — TELEPHONE ENCOUNTER
See but I didn't see a follow up for medical management and refills. You dont need to reply to me but inform patient.

## 2017-11-07 DIAGNOSIS — G62.0 CHEMOTHERAPY-INDUCED PERIPHERAL NEUROPATHY (HCC): ICD-10-CM

## 2017-11-07 DIAGNOSIS — T45.1X5A CHEMOTHERAPY-INDUCED PERIPHERAL NEUROPATHY (HCC): ICD-10-CM

## 2017-11-08 RX ORDER — GABAPENTIN 300 MG/1
300 CAPSULE ORAL
Qty: 90 CAP | Refills: 0 | Status: SHIPPED | OUTPATIENT
Start: 2017-11-08 | End: 2017-12-27 | Stop reason: SDUPTHER

## 2017-12-20 DIAGNOSIS — E11.9 CONTROLLED TYPE 2 DIABETES MELLITUS WITHOUT COMPLICATION, WITHOUT LONG-TERM CURRENT USE OF INSULIN (HCC): ICD-10-CM

## 2017-12-20 RX ORDER — METFORMIN HYDROCHLORIDE 750 MG/1
TABLET, EXTENDED RELEASE ORAL
Qty: 90 TAB | Refills: 0 | Status: SHIPPED | OUTPATIENT
Start: 2017-12-20 | End: 2018-01-26 | Stop reason: SDUPTHER

## 2018-01-06 NOTE — PROGRESS NOTES
Follow up visit, pt states she had a 2nd opinion at [address] [place] [address], Patient states she is no longer taking the following medications: oxycodone IR unknown

## 2018-01-26 ENCOUNTER — OFFICE VISIT (OUTPATIENT)
Dept: INTERNAL MEDICINE CLINIC | Age: 48
End: 2018-01-26

## 2018-01-26 VITALS
HEART RATE: 73 BPM | RESPIRATION RATE: 20 BRPM | BODY MASS INDEX: 27.01 KG/M2 | OXYGEN SATURATION: 99 % | WEIGHT: 137.6 LBS | HEIGHT: 60 IN | DIASTOLIC BLOOD PRESSURE: 72 MMHG | TEMPERATURE: 97.6 F | SYSTOLIC BLOOD PRESSURE: 108 MMHG

## 2018-01-26 DIAGNOSIS — E11.9 CONTROLLED TYPE 2 DIABETES MELLITUS WITHOUT COMPLICATION, WITHOUT LONG-TERM CURRENT USE OF INSULIN (HCC): ICD-10-CM

## 2018-01-26 DIAGNOSIS — Z00.00 PHYSICAL EXAM: Primary | ICD-10-CM

## 2018-01-26 DIAGNOSIS — T45.1X5A CHEMOTHERAPY-INDUCED PERIPHERAL NEUROPATHY (HCC): ICD-10-CM

## 2018-01-26 DIAGNOSIS — G62.0 CHEMOTHERAPY-INDUCED PERIPHERAL NEUROPATHY (HCC): ICD-10-CM

## 2018-01-26 DIAGNOSIS — M79.604 LEG PAIN, RIGHT: ICD-10-CM

## 2018-01-26 DIAGNOSIS — Z85.43 HISTORY OF OVARIAN CANCER: ICD-10-CM

## 2018-01-26 RX ORDER — DICLOFENAC SODIUM 10 MG/G
4 GEL TOPICAL 4 TIMES DAILY
Qty: 5 EACH | Refills: 0 | Status: SHIPPED | OUTPATIENT
Start: 2018-01-26 | End: 2018-02-25

## 2018-01-26 RX ORDER — GABAPENTIN 300 MG/1
300 CAPSULE ORAL
Qty: 30 CAP | Refills: 2 | Status: SHIPPED | OUTPATIENT
Start: 2018-01-26 | End: 2018-04-26

## 2018-01-26 RX ORDER — ERGOCALCIFEROL 1.25 MG/1
50000 CAPSULE ORAL
Qty: 52 CAP | Refills: 0 | Status: SHIPPED | OUTPATIENT
Start: 2018-01-26 | End: 2018-02-02 | Stop reason: SDUPTHER

## 2018-01-26 RX ORDER — GABAPENTIN 300 MG/1
CAPSULE ORAL
Qty: 90 CAP | Refills: 0 | Status: CANCELLED | OUTPATIENT
Start: 2018-01-26

## 2018-01-26 RX ORDER — METFORMIN HYDROCHLORIDE 750 MG/1
TABLET, EXTENDED RELEASE ORAL
Qty: 90 TAB | Refills: 0 | Status: SHIPPED | OUTPATIENT
Start: 2018-01-26 | End: 2018-02-02 | Stop reason: DRUGHIGH

## 2018-01-26 RX ORDER — CETIRIZINE HCL 10 MG
TABLET ORAL
COMMUNITY
End: 2022-04-25 | Stop reason: ALTCHOICE

## 2018-01-26 NOTE — PROGRESS NOTES
Pt here to be seen for complete physical and medication refills. Chief Complaint   Patient presents with    Complete Physical     Visit Vitals    /72 (BP 1 Location: Right arm, BP Patient Position: Sitting)    Pulse 73    Temp 97.6 °F (36.4 °C) (Oral)    Resp 20    Ht 5' (1.524 m)    Wt 137 lb 9.6 oz (62.4 kg)    SpO2 99%    BMI 26.87 kg/m2     1. Have you been to the ER, urgent care clinic since your last visit? Hospitalized since your last visit? No    2. Have you seen or consulted any other health care providers outside of the [address] [place] [address] since your last visit? Include any pap smears or colon screening.  No

## 2018-01-26 NOTE — PROGRESS NOTES
Written by Brooke Auguste, as dictated by Dr. Mattie Nam MD.    Kat Clarke is a 52 y.o. female. HPI  The patient comes in today for a complete physical examination. She last saw her oncologist this month for her hx of ovarian cancer, who she sees every 3 months. She is participating in a diet/exercise clinical trial for ovarian cancer which has her eating more fruits and vegetables and exercising regularly. She is not currently taking any medication for this. She is still experiencing chemo-induced tingling, but she has learned to deal with it and she still takes gabapentin occasionally (which does make her feel sleepy). She walks daily as well. Her cancer antigen was 11 when last checked last month by Oncologist.    She is still taking 50,000 iu vitamin D weekly which she has been taking for a long time. She does not eat meat and takes a B-complex supplement daily. Her last HA1c was 6.2% in 08/2017. She has been experiencing R calf pain, which is worse at night. She had a Doppler in 2016 which was normal.    She had her flu shot this season. Patient Active Problem List   Diagnosis Code    AR (allergic rhinitis) J30.9    Asthma J45.909    Family history of diabetes mellitus (DM) Z83.3    Other activity E65    Hypercholesterolemia E78.00    S/P laparotomy Z98.890    Carcinomatosis ([address] C80.0    Fallopian tube cancer, carcinoma ([address] C57.00    Anemia associated with chemotherapy D64.81, T45.1X5A    Peritoneal adhesions K66.0    Pelvic adhesions N73.6    Incisional hernia K43.2    Ovarian ca ([address] C56.9        Current Outpatient Prescriptions on File Prior to Visit   Medication Sig Dispense Refill    gabapentin (NEURONTIN) 300 mg capsule TAKE 1 CAPSULE BY MOUTH  NIGHTLY 90 Cap 0    b complex vitamins (B COMPLEX 1) tablet Take 1 Tab by mouth daily.       glucose blood VI test strips (ASCENSIA AUTODISC VI, ONE TOUCH ULTRA TEST VI) strip Glucose one touch test strips 100 Strip 11    glucose blood VI test strips (ACCU-CHEK REYNA PLUS TEST STRP) strip Use four times a day and PRN. 100 Strip 5    glucose blood VI test strips (ACCU-CHEK ACTIVE TEST) strip Use QID and  Strip 5    Lancets misc Use as directed. 1 Each 11    CRANIAL PROSTHESIS misc Cranial Prosthesis  Diagnosis code  Cpt code (Patient not taking: Reported on 2017) 1 Each 2    lidocaine-prilocaine (EMLA) topical cream Apply small amount over port area and cover with band aid 1 hour before chemo (Patient not taking: Reported on 2017) 30 g 2     No current facility-administered medications on file prior to visit. Allergies   Allergen Reactions    Tylenol [Acetaminophen] Swelling     Tylenol pm only  SLURRED SPEECH. Past Medical History:   Diagnosis Date    AR (allergic rhinitis) 2010    Asthma 2010    ALLERGY RELATED; NONE USED SINCE     Cancer [facility])     OVARIAN AND FALLOPIAN TUBE    DM mellitus, gestational 2010    PRE DIABETIC    Elevated TSH 2010    Family history of diabetes mellitus (DM)     History of ovarian cancer 2016    Hypercholesterolemia 2011    elevated particle number    Ill-defined condition 16    HYDRONEPHROSIS, LEFT RENAL MASS (BEING WORKED UP BY DR MC Triplett, UROL.)    Impaired glucose tolerance 2011    Menopause 2016    Other activity     BCG vaccine as a child    S/P laparotomy 2016    Uterine fibroid 2010    ENDOMETRIOSIS    Vitamin D deficiency 2010       Past Surgical History:   Procedure Laterality Date    ABDOMEN SURGERY PROC UNLISTED  2016    EXP LAP FOR BOWEL OBST.     HX APPENDECTOMY      HX  SECTION  ,     HX GYN  16    hysterectomy, TUBES REMOVED  ([facility])    HX HYSTERECTOMY  2016    HX OOPHORECTOMY Bilateral 2016    HX OTHER SURGICAL      small bowel obstruction, ILEUS POSTOP    HX VASCULAR ACCESS PORT A CATH L CHEST WALL    HX WISDOM TEETH EXTRACTION      age 28       Family History   Problem Relation Age of Onset    Diabetes Mother     Diabetes Father     Hypertension Father     Diabetes Maternal Grandmother     Diabetes Maternal Grandfather     No Known Problems Sister     Diabetes Paternal Uncle     Anesth Problems Neg Hx        Social History     Social History    Marital status:      Spouse name: N/A    Number of children: N/A    Years of education: N/A     Occupational History    Not on file. Social History Main Topics    Smoking status: Never Smoker    Smokeless tobacco: Never Used    Alcohol use No    Drug use: No    Sexual activity: Yes     Partners: Male     Other Topics Concern    Not on file     Social History Narrative       Review of Systems   Constitutional: Negative for malaise/fatigue. HENT: Negative for congestion. Eyes: Negative for blurred vision and pain. Respiratory: Negative for cough and shortness of breath. Cardiovascular: Negative for chest pain and palpitations. Gastrointestinal: Negative for abdominal pain and heartburn. Genitourinary: Negative for frequency and urgency. Musculoskeletal: Negative for joint pain and myalgias. Neurological: Positive for tingling. Negative for dizziness, sensory change, weakness and headaches. Psychiatric/Behavioral: Negative for depression, memory loss and substance abuse. Visit Vitals    /72 (BP 1 Location: Right arm, BP Patient Position: Sitting)    Pulse 73    Temp 97.6 °F (36.4 °C) (Oral)    Resp 20    Ht 5' (1.524 m)    Wt 137 lb 9.6 oz (62.4 kg)    LMP 07/05/2013    SpO2 99%    BMI 26.87 kg/m2       Physical Exam   Constitutional: She is oriented to person, place, and time. She appears well-developed and well-nourished. No distress.    HENT:   Right Ear: External ear normal.   Left Ear: External ear normal.   Eyes: Conjunctivae and EOM are normal. Right eye exhibits no discharge. Left eye exhibits no discharge. Neck: Normal range of motion. Neck supple. Cardiovascular: Normal rate and regular rhythm. Pulmonary/Chest: Effort normal and breath sounds normal. She has no wheezes. Abdominal: Soft. Bowel sounds are normal. There is no tenderness. Musculoskeletal:   R calf tenderness   Lymphadenopathy:     She has no cervical adenopathy. Neurological: She is alert and oriented to person, place, and time. Skin: She is not diaphoretic. Psychiatric: She has a normal mood and affect. Her behavior is normal.   Nursing note and vitals reviewed. Diabetic foot exam:     Left: Vibratory sensation normal    Sharp/dull discrimination normal    Filament test normal sensation with micro filament   Pulse DP: 2+ (normal)   Deformities: None  Right: Vibratory sensation diminished   Sharp/dull discrimination diminished   Filament test normal sensation with micro filament   Pulse DP: 2+ (normal)   Deformities: None    ASSESSMENT and PLAN    ICD-10-CM ICD-9-CM    1. Physical exam Z00.00 V70.9 Complete physical exam done. Basic labs drawn. 2. Controlled type 2 diabetes mellitus without complication, without long-term current use of insulin ([facility]) E11.9 250.00 metFORMIN ER (GLUCOPHAGE XR) 750 mg tablet sent to pharmacy      HEMOGLOBIN A1C WITH EAG      METABOLIC PANEL, COMPREHENSIVE      CBC W/O DIFF      AMB POC URINE, MICROALBUMIN, SEMIQUANT (1 RESULT)       DIABETES FOOT EXAM    Metformin refilled. Will repeat basic labs today. 3. Chemotherapy-induced peripheral neuropathy (HCC) G62.0 357.7 gabapentin (NEURONTIN) 300 mg capsule sent to pharmacy    T45.1X5A E933.1 Gabapentin refilled. Advised to take it slightly earlier as it has been causing her morning grogginess. 4. History of ovarian cancer Z85.43 V10.43 ergocalciferol (VITAMIN D2) 50,000 unit capsule sent to pharmacy      [facility] 3RD GENERATION      VITAMIN D, 25 HYDROXY    Followed by oncology.  Vitamin D 50,000 iu refilled, though I did discuss the risk of the high dose to her kidneys. 5. Leg pain, right M79.604 729.5 diclofenac (VOLTAREN) 1 % gel sent to pharmacy    Doppler was negative last year for any clots. Diclofenac gel ordered. This plan was reviewed with the patient and patient agrees. All questions were answered. This scribe documentation was reviewed by me and accurately reflects the examination and decisions made by me.

## 2018-01-26 NOTE — MR AVS SNAPSHOT
[address] [facility] [address] [place] [address] [address] [facility] 
[phone] Patient: Katina Dominguez MRN: [id number] MQU:[birth year] Visit Information Date & Time Provider Department Dept. Phone Encounter #  
 1/26/2018  8:45 AM Shea Wilson MD [facility] Internal Medicine [phone] [id number] Upcoming Health Maintenance Date Due Pneumococcal 19-64 Highest Risk (1 of 3 - PCV13) 5/30/1989 HEMOGLOBIN A1C Q6M 7/26/2018 LIPID PANEL Q1 8/22/2018 EYE EXAM RETINAL OR DILATED Q1 8/23/2018 FOOT EXAM Q1 1/26/2019 MICROALBUMIN Q1 1/26/2019 PAP AKA CERVICAL CYTOLOGY 8/25/2020 DTaP/Tdap/Td series (2 - Td) 9/7/2020 Allergies as of 1/26/2018  Review Complete On: 1/26/2018 By: Ale Plata LPN Severity Noted Reaction Type Reactions Tylenol [Acetaminophen]  04/25/2016    Swelling Tylenol pm only SLURRED SPEECH. Current Immunizations  Reviewed on 1/17/2017 Name Date Hepatitis B Vaccine 9/21/2010 PPD 1/4/2012 TDAP Vaccine 9/7/2010 Not reviewed this visit You Were Diagnosed With   
  
 Codes Comments Physical exam    -  Primary ICD-10-CM: Z00.00 ICD-9-CM: V70.9 Controlled type 2 diabetes mellitus without complication, without long-term current use of insulin [facility]     ICD-10-CM: E11.9 ICD-9-CM: 250.00 Chemotherapy-induced peripheral neuropathy (HCC)     ICD-10-CM: G62.0, T45.1X5A 
ICD-9-CM: 357.7, E933.1 History of ovarian cancer     ICD-10-CM: Z85.43 
ICD-9-CM: V10.43 Leg pain, right     ICD-10-CM: M79.604 ICD-9-CM: 729.5 Vitals BP Pulse Temp Resp Height(growth percentile) Weight(growth percentile) 108/72 (BP 1 Location: Right arm, BP Patient Position: Sitting) 73 97.6 °F (36.4 °C) (Oral) 20 5' (1.524 m) 137 lb 9.6 oz (62.4 kg) LMP SpO2 BMI OB Status Smoking Status 07/05/2013 99% 26.87 kg/m2 Hysterectomy Never Smoker BMI and BSA Data Body Mass Index Body Surface Area  
 26.87 kg/m 2 1.63 m 2 Preferred Pharmacy Pharmacy Name Phone  [address] [phone] Your Updated Medication List  
  
   
This list is accurate as of: 1/26/18  1:47 PM.  Always use your most recent med list.  
  
  
  
  
 B COMPLEX 1 tablet Generic drug:  b complex vitamins Take 1 Tab by mouth daily. cetirizine 10 mg tablet Commonly known as:  ZYRTEC Take  by mouth. CRANIAL PROSTHESIS Misc Cranial Prosthesis Diagnosis code Cpt code  
  
 diclofenac 1 % Gel Commonly known as:  VOLTAREN Apply 4 g to affected area four (4) times daily for 30 days. Apply on right leg 3 - 4 times a day. ergocalciferol 50,000 unit capsule Commonly known as:  VITAMIN D2 Take 1 Cap by mouth every seven (7) days. * gabapentin 300 mg capsule Commonly known as:  NEURONTIN  
TAKE 1 CAPSULE BY MOUTH  NIGHTLY * gabapentin 300 mg capsule Commonly known as:  NEURONTIN Take 1 Cap by mouth nightly for 90 days. * glucose blood VI test strips strip Commonly known as:  ACCU-CHEK ACTIVE TEST Use QID and PRN  
  
 * glucose blood VI test strips strip Commonly known as:  ACCU-CHEK REYNA PLUS TEST STRP Use four times a day and PRN. * glucose blood VI test strips strip Commonly known as:  ASCENSIA AUTODISC VI, ONE TOUCH ULTRA TEST VI  
Glucose one touch test strips Lancets Misc Use as directed. lidocaine-prilocaine topical cream  
Commonly known as:  EMLA Apply small amount over port area and cover with band aid 1 hour before chemo  
  
 metFORMIN  mg tablet Commonly known as:  GLUCOPHAGE XR  
TAKE 1 TABLET BY MOUTH  DAILY * Notice: This list has 5 medication(s) that are the same as other medications prescribed for you. Read the directions carefully, and ask your doctor or other care provider to review them with you. Prescriptions Printed Refills  
 diclofenac (VOLTAREN) 1 % gel 0 Sig: Apply 4 g to affected area four (4) times daily for 30 days. Apply on right leg 3 - 4 times a day. Class: Print Route: Topical  
  
Prescriptions Sent to Pharmacy Refills  
 metFORMIN ER (GLUCOPHAGE XR) 750 mg tablet 0 Sig: TAKE 1 TABLET BY MOUTH  DAILY Class: Normal  
 Pharmacy:  [address] Ph #: [phone]  
 ergocalciferol (VITAMIN D2) 50,000 unit capsule 0 Sig: Take 1 Cap by mouth every seven (7) days. Class: Normal  
 Pharmacy:  [address] Ph #: [phone] Route: Oral  
 gabapentin (NEURONTIN) 300 mg capsule 2 Sig: Take 1 Cap by mouth nightly for 90 days. Class: Normal  
 Pharmacy:  [address] Ph #: [phone] Route: Oral  
  
We Performed the Following AMB POC URINE, MICROALBUMIN, SEMIQUANT (1 RESULT) [26043 CPT(R)] CBC W/O DIFF [25329 CPT(R)] HEMOGLOBIN A1C WITH EAG [86255 CPT(R)]  DIABETES FOOT EXAM [7 Custom] METABOLIC PANEL, COMPREHENSIVE [60685 CPT(R)] TSH 3RD GENERATION [79319 CPT(R)] VITAMIN D, 25 HYDROXY [id number] CPT(R)] Introducing [facility]! Dear Benny Carlos: 
Thank you for requesting a [employer] account. Our records indicate that you already have an active [employer] account. You can access your account anytime at [email or web address] Did you know that you can access your hospital and ER discharge instructions at any time in [employer]? You can also review all of your test results from your hospital stay or ER visit. Additional Information If you have questions, please visit the Frequently Asked Questions section of the [employer] website at [email or web address] Remember, [employer] is NOT to be used for urgent needs. For medical emergencies, dial 911. Now available from your iPhone and Android! Please provide this summary of care documentation to your next provider. Your primary care clinician is listed as Kori Watson. If you have any questions after today's visit, please call [phone].

## 2018-01-27 LAB
25(OH)D3+25(OH)D2 SERPL-MCNC: 29 NG/ML (ref 30–100)
ALBUMIN SERPL-MCNC: 4.2 G/DL (ref 3.5–5.5)
ALBUMIN/GLOB SERPL: 1.1 {RATIO} (ref 1.2–2.2)
ALP SERPL-CCNC: 78 IU/L (ref 39–117)
ALT SERPL-CCNC: 43 IU/L (ref 0–32)
AST SERPL-CCNC: 27 IU/L (ref 0–40)
BILIRUB SERPL-MCNC: 0.5 MG/DL (ref 0–1.2)
BUN SERPL-MCNC: 10 MG/DL (ref 6–24)
BUN/CREAT SERPL: 17 (ref 9–23)
CALCIUM SERPL-MCNC: 9.1 MG/DL (ref 8.7–10.2)
CHLORIDE SERPL-SCNC: 101 MMOL/L (ref 96–106)
CO2 SERPL-SCNC: 27 MMOL/L (ref 18–29)
CREAT SERPL-MCNC: 0.6 MG/DL (ref 0.57–1)
ERYTHROCYTE [DISTWIDTH] IN BLOOD BY AUTOMATED COUNT: 13.4 % (ref 12.3–15.4)
EST. AVERAGE GLUCOSE BLD GHB EST-MCNC: 114 MG/DL
GFR SERPLBLD CREATININE-BSD FMLA CKD-EPI: 109 ML/MIN/1.73
GFR SERPLBLD CREATININE-BSD FMLA CKD-EPI: 126 ML/MIN/1.73
GLOBULIN SER CALC-MCNC: 3.7 G/DL (ref 1.5–4.5)
GLUCOSE SERPL-MCNC: 109 MG/DL (ref 65–99)
HBA1C MFR BLD: 5.6 % (ref 4.8–5.6)
HCT VFR BLD AUTO: 35.9 % (ref 34–46.6)
HGB BLD-MCNC: 11.9 G/DL (ref 11.1–15.9)
MCH RBC QN AUTO: 30 PG (ref 26.6–33)
MCHC RBC AUTO-ENTMCNC: 33.1 G/DL (ref 31.5–35.7)
MCV RBC AUTO: 90 FL (ref 79–97)
PLATELET # BLD AUTO: 184 X10E3/UL (ref 150–379)
POTASSIUM SERPL-SCNC: 4.2 MMOL/L (ref 3.5–5.2)
PROT SERPL-MCNC: 7.9 G/DL (ref 6–8.5)
RBC # BLD AUTO: 3.97 X10E6/UL (ref 3.77–5.28)
SODIUM SERPL-SCNC: 142 MMOL/L (ref 134–144)
TSH SERPL DL<=0.005 MIU/L-ACNC: 3.54 UIU/ML (ref 0.45–4.5)
WBC # BLD AUTO: 5.7 X10E3/UL (ref 3.4–10.8)

## 2018-01-29 NOTE — PROGRESS NOTES
Soraya, your diabetes number ( HbA1C ) came back in normal range. I would suggest cutting down Metformin dose to 500mg instead of 750 mg. Let me know which pharmacy should we send your medication?  Continue vitamin D.

## 2018-01-31 ENCOUNTER — DOCUMENTATION ONLY (OUTPATIENT)
Dept: INTERNAL MEDICINE CLINIC | Age: 48
End: 2018-01-31

## 2018-01-31 NOTE — PROGRESS NOTES
[facility] were [facility] to them on patients last visit. They are no longer the patients [facility]. Called and lm for patient to return call to office to find out who her pharmacy is to send medications to correct pharmacy.

## 2018-02-02 ENCOUNTER — DOCUMENTATION ONLY (OUTPATIENT)
Dept: INTERNAL MEDICINE CLINIC | Age: 48
End: 2018-02-02

## 2018-02-02 DIAGNOSIS — E11.9 CONTROLLED TYPE 2 DIABETES MELLITUS WITHOUT COMPLICATION, WITHOUT LONG-TERM CURRENT USE OF INSULIN (HCC): ICD-10-CM

## 2018-02-02 DIAGNOSIS — E11.9 WELL CONTROLLED DIABETES MELLITUS (HCC): Primary | ICD-10-CM

## 2018-02-02 DIAGNOSIS — Z85.43 HISTORY OF OVARIAN CANCER: ICD-10-CM

## 2018-02-02 RX ORDER — METFORMIN HYDROCHLORIDE 750 MG/1
TABLET, EXTENDED RELEASE ORAL
Qty: 90 TAB | Refills: 0 | OUTPATIENT
Start: 2018-02-02

## 2018-02-02 RX ORDER — ERGOCALCIFEROL 1.25 MG/1
50000 CAPSULE ORAL
Qty: 52 CAP | Refills: 0 | Status: SHIPPED | OUTPATIENT
Start: 2018-02-02 | End: 2020-02-11 | Stop reason: ALTCHOICE

## 2018-02-02 RX ORDER — METFORMIN HYDROCHLORIDE 500 MG/1
500 TABLET, EXTENDED RELEASE ORAL
Qty: 90 TAB | Refills: 0 | Status: SHIPPED | OUTPATIENT
Start: 2018-02-02 | End: 2018-04-15 | Stop reason: SDUPTHER

## 2018-02-02 NOTE — PROGRESS NOTES
Spoke with patient and got correct pharmacy that medications should be going to[facility] as the [facility] pharmacy and is the correct pharmacy.

## 2018-02-02 NOTE — TELEPHONE ENCOUNTER
Pt provided wrong mailorder pharmacy during visit, and medication was sent back to the office correct pharmacy is now listed and will need to be sent. Thank you.

## 2018-04-15 DIAGNOSIS — E11.9 WELL CONTROLLED DIABETES MELLITUS (HCC): ICD-10-CM

## 2018-04-15 RX ORDER — METFORMIN HYDROCHLORIDE 500 MG/1
TABLET, EXTENDED RELEASE ORAL
Qty: 90 TAB | Refills: 0 | Status: SHIPPED | OUTPATIENT
Start: 2018-04-15 | End: 2018-07-14 | Stop reason: SDUPTHER

## 2018-07-14 DIAGNOSIS — E11.9 WELL CONTROLLED DIABETES MELLITUS (HCC): ICD-10-CM

## 2018-07-18 RX ORDER — METFORMIN HYDROCHLORIDE 500 MG/1
TABLET, EXTENDED RELEASE ORAL
Qty: 90 TAB | Refills: 0 | Status: SHIPPED | OUTPATIENT
Start: 2018-07-18 | End: 2019-05-06 | Stop reason: SDUPTHER

## 2018-08-15 ENCOUNTER — OFFICE VISIT (OUTPATIENT)
Dept: INTERNAL MEDICINE CLINIC | Age: 48
End: 2018-08-15

## 2018-08-15 VITALS
BODY MASS INDEX: 25.8 KG/M2 | WEIGHT: 140.2 LBS | SYSTOLIC BLOOD PRESSURE: 104 MMHG | HEART RATE: 82 BPM | OXYGEN SATURATION: 98 % | HEIGHT: 62 IN | DIASTOLIC BLOOD PRESSURE: 70 MMHG | TEMPERATURE: 98.4 F | RESPIRATION RATE: 16 BRPM

## 2018-08-15 DIAGNOSIS — M67.40 GANGLION CYST: ICD-10-CM

## 2018-08-15 DIAGNOSIS — F41.8 ANXIETY ABOUT HEALTH: ICD-10-CM

## 2018-08-15 DIAGNOSIS — F19.982 DRUG-INDUCED INSOMNIA (HCC): ICD-10-CM

## 2018-08-15 DIAGNOSIS — R12 HEART BURN: ICD-10-CM

## 2018-08-15 DIAGNOSIS — C76.3 SEROUS CARCINOMA OF FEMALE PELVIS (HCC): Primary | ICD-10-CM

## 2018-08-15 DIAGNOSIS — R20.2 PARESTHESIA: ICD-10-CM

## 2018-08-15 RX ORDER — DOXORUBICIN HYDROCHLORIDE 2 MG/ML
INJECTABLE, LIPOSOMAL INTRAVENOUS ONCE
COMMUNITY

## 2018-08-15 RX ORDER — GABAPENTIN 300 MG/1
300 CAPSULE ORAL 2 TIMES DAILY
Qty: 60 CAP | Refills: 0 | Status: SHIPPED | OUTPATIENT
Start: 2018-08-15 | End: 2018-09-20 | Stop reason: SDUPTHER

## 2018-08-15 RX ORDER — ALPRAZOLAM 0.25 MG/1
0.25 TABLET ORAL
Qty: 15 TAB | Refills: 0 | Status: SHIPPED | OUTPATIENT
Start: 2018-08-15 | End: 2019-06-21 | Stop reason: ALTCHOICE

## 2018-08-15 NOTE — MR AVS SNAPSHOT
[address] [facility] [address] [facility] [place] [id number] Patient: Tanvir Davidson MRN: [id number] MRE:1[birth year]49/0342 Visit Information Date & Time Provider Department Dept. Phone Encounter #  
 8/15/2018 12:45 PM Vada Bumpers, MD [facility] Internal Medicine [phone] [id number] Upcoming Health Maintenance Date Due Pneumococcal 19-64 Highest Risk (1 of 3 - PCV13) 5/30/1989 HEMOGLOBIN A1C Q6M 7/26/2018 Influenza Age 5 to Adult 8/1/2018 LIPID PANEL Q1 8/22/2018 FOOT EXAM Q1 1/26/2019 MICROALBUMIN Q1 1/26/2019 EYE EXAM RETINAL OR DILATED Q1 2/14/2019 PAP AKA CERVICAL CYTOLOGY 8/25/2020 DTaP/Tdap/Td series (2 - Td) 9/7/2020 Allergies as of 8/15/2018  Review Complete On: 8/15/2018 By: Vada Bumpers, MD  
  
 Severity Noted Reaction Type Reactions Tylenol [Acetaminophen]  04/25/2016    Swelling Tylenol pm only SLURRED SPEECH. Current Immunizations  Reviewed on 1/17/2017 Name Date Hepatitis B Vaccine 9/21/2010 PPD 1/4/2012 TDAP Vaccine 9/7/2010 Not reviewed this visit You Were Diagnosed With   
  
 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 
ICD-9-CM: 787.1 Vitals BP Pulse Temp Resp Height(growth percentile) Weight(growth percentile) 104/70 (BP 1 Location: Left arm, BP Patient Position: Sitting) 82 98.4 °F (36.9 °C) (Oral) 16 5' 1.52\" (1.563 m) 140 lb 3.2 oz (63.6 kg) LMP SpO2 BMI OB Status Smoking Status 07/05/2013 98% 26.04 kg/m2 Hysterectomy Never Smoker Vitals History BMI and BSA Data Body Mass Index Body Surface Area 26.04 kg/m 2 1.66 m 2 Preferred Pharmacy Pharmacy Name Phone [facility][id number], [address] [place] [address] [phone] Your Updated Medication List  
  
   
This list is accurate as of 8/15/18  1:47 PM.  Always use your most recent med list.  
  
  
  
  
 ALPRAZolam 0.25 mg tablet Commonly known as:  Melene Reels Take 1 Tab by mouth nightly as needed for Anxiety for up to 15 doses. Max Daily Amount: 0.25 mg.  
  
 B COMPLEX 1 tablet Generic drug:  b complex vitamins Take 1 Tab by mouth daily. cetirizine 10 mg tablet Commonly known as:  ZYRTEC Take  by mouth. CRANIAL PROSTHESIS Misc Cranial Prosthesis Diagnosis code Cpt code DOXIL 2 mg/mL injection Generic drug:  liposomal DOXOrubicin (DOXIL/LIPODOX) by IntraVENous route once.  
  
 ergocalciferol 50,000 unit capsule Commonly known as:  VITAMIN D2 Take 1 Cap by mouth every seven (7) days. gabapentin 300 mg capsule Commonly known as:  NEURONTIN Take 1 Cap by mouth two (2) times a day for 30 days. * glucose blood VI test strips strip Commonly known as:  ACCU-CHEK ACTIVE TEST Use QID and PRN  
  
 * glucose blood VI test strips strip Commonly known as:  ACCU-CHEK REYNA PLUS TEST STRP Use four times a day and PRN. * glucose blood VI test strips strip Commonly known as:  ASCENSIA AUTODISC VI, ONE TOUCH ULTRA TEST VI  
Glucose one touch test strips Lancets Misc Use as directed. lidocaine-prilocaine topical cream  
Commonly known as:  EMLA Apply small amount over port area and cover with band aid 1 hour before chemo  
  
 metFORMIN  mg tablet Commonly known as:  GLUCOPHAGE XR  
TAKE 1 TABLET DAILY WITH DINNER  
  
 * Notice: This list has 3 medication(s) that are the same as other medications prescribed for you.  Read the directions carefully, and ask your doctor or other care provider to review them with you. Prescriptions Printed Refills ALPRAZolam (XANAX) 0.25 mg tablet 0 Sig: Take 1 Tab by mouth nightly as needed for Anxiety for up to 15 doses. Max Daily Amount: 0.25 mg.  
 Class: Print Route: Oral  
  
Prescriptions Sent to Pharmacy Refills  
 gabapentin (NEURONTIN) 300 mg capsule 0 Sig: Take 1 Cap by mouth two (2) times a day for 30 days. Class: Normal  
 Pharmacy: [facility]/pharmacy #[id number]- [facility], [address] [place] [address] #: [phone] Route: Oral  
  
Introducing [facility]! Dear Ishmael Villarreal: 
Thank you for requesting a [employer] account. Our records indicate that you already have an active [employer] account. You can access your account anytime at [email or web address]. [employer]/(In)[employer] Network Did you know that you can access your hospital and ER discharge instructions at any time in [employer]? You can also review all of your test results from your hospital stay or ER visit. Additional Information If you have questions, please visit the Frequently Asked Questions section of the [employer] website at [email or web address]. [employer]/(In)[employer]/. Remember, [employer] is NOT to be used for urgent needs. For medical emergencies, dial 911. Now available from your iPhone and Android! Please provide this summary of care documentation to your next provider. Your primary care clinician is listed as Hugo [place]ille. If you have any questions after today's visit, please call [phone].

## 2018-08-15 NOTE — PROGRESS NOTES
Chief Complaint   Patient presents with    Cyst     on finger    Ear Pain     left     Abdominal Pain     burning    Insomnia     Visit Vitals    /70 (BP 1 Location: Left arm, BP Patient Position: Sitting)    Pulse 82    Temp 98.4 °F (36.9 °C) (Oral)    Resp 16    Ht 5' 1.52\" (1.563 m)    Wt 140 lb 3.2 oz (63.6 kg)    SpO2 98%    BMI 26.04 kg/m2     1. Have you been to the ER, urgent care clinic since your last visit? Hospitalized since your last visit?no    2. Have you seen or consulted any other health care providers outside of the [facility] since your last visit? Include any pap smears or colon screening.  [facility]

## 2018-08-15 NOTE — PROGRESS NOTES
Written by Gertrudis Cruz, as dictated by Dr. Daniel Weaver MD.    Dawit Santana is a 50 y.o. female. HPI  The patient present today c/o insomnia as she is not able to sleep due to leg pain resulting from chemotherapy. She has tried maintaining a good sleep pattern, but she has not tried any OTC medication. She is taking vitamin B12 and gabapentin 300 mg before bed, without relief. She reports that her sleep is also disrupted due to worrying about the future. She is also experiencing L ear pain /fullness. No discharge or sore throat. She has a cyst on her 3rd finger on her L hand. 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 She is followed by Dr. Jose Luis López (oncology) at [place]. Her CA-125 was elevated at 44. She does not having any adhesions. She was participating in a study concerning how diet and exercise can prevent ovarian cancer from returning. She was following a vegetarian diet. She is no longer part of the study because her cancer returned. She is managing her pain with motrin and Tylenol. She is not taking oxycodone. She is also experiencing a burning sensation in her abdomen and diarrhea, which is due to the chemotherapy. She is taking Tums for this. She does not drink ETOH.     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 52 Cap 0    cetirizine (ZYRTEC) 10 mg tablet Take  by mouth.  glucose blood VI test strips (ASCENSIA AUTODISC VI, ONE TOUCH ULTRA TEST VI) strip Glucose one touch test strips 100 Strip 11    b complex vitamins (B COMPLEX 1) tablet Take 1 Tab by mouth daily.  glucose blood VI test strips (ACCU-CHEK REYNA PLUS TEST STRP) strip Use four times a day and PRN. 100 Strip 5    glucose blood VI test strips (ACCU-CHEK ACTIVE TEST) strip Use QID and  Strip 5    Lancets misc Use as directed. 1 Each 11    CRANIAL PROSTHESIS misc Cranial Prosthesis  Diagnosis code  Cpt code 1 Each 2    lidocaine-prilocaine (EMLA) topical cream Apply small amount over port area and cover with band aid 1 hour before chemo (Patient not taking: Reported on 2017) 30 g 2     No current facility-administered medications on file prior to visit. Allergies   Allergen Reactions    Tylenol [Acetaminophen] Swelling     Tylenol pm only  SLURRED SPEECH. Past Medical History:   Diagnosis Date    AR (allergic rhinitis) 2010    Asthma 2010    ALLERGY RELATED; NONE USED SINCE     Cancer [facility])     OVARIAN AND FALLOPIAN TUBE    DM mellitus, gestational 2010    PRE DIABETIC    Elevated TSH 2010    Family history of diabetes mellitus (DM)     History of ovarian cancer 2016    Hypercholesterolemia 2011    elevated particle number    Ill-defined condition 16    HYDRONEPHROSIS, LEFT RENAL MASS (BEING WORKED UP BY DR MC Williamson, UROL.)    Impaired glucose tolerance 2011    Menopause 2016    Other activity     BCG vaccine as a child    S/P laparotomy 2016    Uterine fibroid 2010    ENDOMETRIOSIS    Vitamin D deficiency 2010       Past Surgical History:   Procedure Laterality Date    ABDOMEN SURGERY PROC UNLISTED  2016    EXP LAP FOR BOWEL OBST.     HX APPENDECTOMY      HX  SECTION 0, 1999    HX GYN  2/24/16    hysterectomy, TUBES REMOVED  ([facility] D[facility]IAN [facility]SARI--[facility])    HX HYSTERECTOMY  06/2016    HX OOPHORECTOMY Bilateral 06/2016    HX OTHER SURGICAL  5/16    small bowel obstruction, ILEUS POSTOP    HX VASCULAR ACCESS      PORT A CATH L CHEST WALL    HX WISDOM TEETH EXTRACTION      age 28       Family History   Problem Relation Age of Onset    Diabetes Mother     Diabetes Father     Hypertension Father     Diabetes Maternal Grandmother     Diabetes Maternal Grandfather     No Known Problems Sister     Diabetes Paternal Uncle     Anesth Problems Neg Hx        Social History     Social History    Marital status:      Spouse name: N/A    Number of children: N/A    Years of education: N/A     Occupational History    Not on file. Social History Main Topics    Smoking status: Never Smoker    Smokeless tobacco: Never Used    Alcohol use No    Drug use: No    Sexual activity: Yes     Partners: Male     Other Topics Concern    Not on file     Social History Narrative       Review of Systems   Constitutional: Negative for malaise/fatigue. HENT: Positive for ear pain. Negative for congestion. Eyes: Negative for blurred vision and pain. Respiratory: Negative for cough and shortness of breath. Cardiovascular: Negative for chest pain and palpitations. Gastrointestinal: Positive for abdominal pain, diarrhea and heartburn. Genitourinary: Negative for frequency and urgency. Musculoskeletal: Positive for joint pain. Negative for myalgias. Neurological: Positive for tingling and sensory change. Negative for dizziness, weakness and headaches. Psychiatric/Behavioral: Negative for depression, memory loss and substance abuse. The patient is nervous/anxious and has insomnia.       Visit Vitals    /70 (BP 1 Location: Left arm, BP Patient Position: Sitting)    Pulse 82    Temp 98.4 °F (36.9 °C) (Oral)    Resp 16    Ht 5' 1.52\" (1.563 m)    Wt 140 lb 3.2 oz (63.6 kg)    LMP 07/05/2013    SpO2 98%    BMI 26.04 kg/m2       Physical Exam   Constitutional: She is oriented to person, place, and time. She appears well-developed and well-nourished. No distress. HENT:   Right Ear: External ear normal.   Left Ear: External ear normal.   Fluid behind L tympanic membrane   Eyes: Conjunctivae and EOM are normal. Right eye exhibits no discharge. Left eye exhibits no discharge. Neck: Normal range of motion. Neck supple. Cardiovascular: Normal rate and regular rhythm. Pulmonary/Chest: Effort normal and breath sounds normal. She has no wheezes. Abdominal: Soft. Bowel sounds are normal. There is no tenderness. Lymphadenopathy:     She has no cervical adenopathy. Neurological: She is alert and oriented to person, place, and time. Skin: She is not diaphoretic. Ganglion cyst on L 3rd finger   Psychiatric: She has a normal mood and affect. Her behavior is normal.   Nursing note and vitals reviewed. ASSESSMENT and PLAN    ICD-10-CM ICD-9-CM    1. 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 Gabapentin 300 mg prescribed. She should take 2 tablets before bed. I recommend she try eliminating dairy products from her diet. 2. Drug-induced insomnia (HCC) F19.982 292.85 ALPRAZolam (XANAX) 0.25 mg tablet script given to patient. E980.5 Xanax 0.25 mg prescribed. If 0.25 mg is not strong enough, she can take 2 tablets. She should only take as needed, 2-3 times per week for sleep. 3. Paresthesia R20.2 782.0 gabapentin (NEURONTIN) 300 mg capsule sent to pharmacy. Discussed that paresthesia is a side effect of chemotherapy. 4. Anxiety about health F41.8 300.09 ALPRAZolam (XANAX) 0.25 mg tablet script given to patient. 5. Ganglion cyst M67.40 727.43 She should apply warm compresses. 6. Heart burn R12 787.1 She should take Prilosec in the morning 30 minutes before eating.  Discussed that heartburn is due to her chemotherapy treatment. This plan was reviewed with the patient and patient agrees. All questions were answered. This scribe documentation was reviewed by me and accurately reflects the examination and decisions made by me. This note will not be viewable in [address]

## 2018-09-20 DIAGNOSIS — C76.3 SEROUS CARCINOMA OF FEMALE PELVIS (HCC): ICD-10-CM

## 2018-09-20 DIAGNOSIS — R20.2 PARESTHESIA: ICD-10-CM

## 2018-09-20 RX ORDER — GABAPENTIN 300 MG/1
300 CAPSULE ORAL 2 TIMES DAILY
Qty: 60 CAP | Refills: 0 | Status: SHIPPED | OUTPATIENT
Start: 2018-09-20 | End: 2018-09-21 | Stop reason: SDUPTHER

## 2018-09-21 DIAGNOSIS — R20.2 PARESTHESIA: ICD-10-CM

## 2018-09-21 DIAGNOSIS — C76.3 SEROUS CARCINOMA OF FEMALE PELVIS (HCC): ICD-10-CM

## 2018-09-21 RX ORDER — GABAPENTIN 300 MG/1
300 CAPSULE ORAL 2 TIMES DAILY
Qty: 180 CAP | Refills: 0 | Status: SHIPPED | OUTPATIENT
Start: 2018-09-21 | End: 2018-10-21

## 2018-11-19 RX ORDER — GABAPENTIN 300 MG/1
300 CAPSULE ORAL 3 TIMES DAILY
Qty: 180 CAP | Refills: 0 | Status: SHIPPED | OUTPATIENT
Start: 2018-11-19 | End: 2018-11-23 | Stop reason: SDUPTHER

## 2018-11-23 RX ORDER — GABAPENTIN 300 MG/1
300 CAPSULE ORAL 3 TIMES DAILY
Qty: 270 CAP | Refills: 0 | Status: SHIPPED | OUTPATIENT
Start: 2018-11-23 | End: 2020-12-15

## 2019-03-04 RX ORDER — GLYBURIDE 2.5 MG/1
TABLET ORAL
Qty: 30 TAB | Refills: 1 | Status: SHIPPED | OUTPATIENT
Start: 2019-03-04 | End: 2019-03-21 | Stop reason: SINTOL

## 2019-03-06 ENCOUNTER — HOSPITAL ENCOUNTER (OUTPATIENT)
Dept: MAMMOGRAPHY | Age: 49
Discharge: HOME | End: 2019-03-06
Payer: COMMERCIAL

## 2019-03-06 DIAGNOSIS — Z12.39 SCREENING BREAST EXAMINATION: ICD-10-CM

## 2019-03-06 PROCEDURE — 77067 SCR MAMMO BI INCL CAD: CPT

## 2019-03-21 ENCOUNTER — OFFICE VISIT (OUTPATIENT)
Dept: PRIMARY CARE CLINIC | Age: 49
End: 2019-03-21

## 2019-03-21 VITALS
HEART RATE: 86 BPM | WEIGHT: 147.4 LBS | SYSTOLIC BLOOD PRESSURE: 110 MMHG | OXYGEN SATURATION: 99 % | BODY MASS INDEX: 27.12 KG/M2 | DIASTOLIC BLOOD PRESSURE: 70 MMHG | RESPIRATION RATE: 14 BRPM | HEIGHT: 62 IN | TEMPERATURE: 97.9 F

## 2019-03-21 DIAGNOSIS — T45.1X5A CHEMOTHERAPY-INDUCED PERIPHERAL NEUROPATHY (HCC): ICD-10-CM

## 2019-03-21 DIAGNOSIS — C80.0 CARCINOMATOSIS (HCC): ICD-10-CM

## 2019-03-21 DIAGNOSIS — G62.0 CHEMOTHERAPY-INDUCED PERIPHERAL NEUROPATHY (HCC): ICD-10-CM

## 2019-03-21 DIAGNOSIS — E55.9 VITAMIN D DEFICIENCY: ICD-10-CM

## 2019-03-21 DIAGNOSIS — E11.9 CONTROLLED TYPE 2 DIABETES MELLITUS WITHOUT COMPLICATION, WITHOUT LONG-TERM CURRENT USE OF INSULIN (HCC): ICD-10-CM

## 2019-03-21 DIAGNOSIS — Z00.00 PHYSICAL EXAM: Primary | ICD-10-CM

## 2019-03-21 LAB
ALBUMIN UR QL STRIP: 30 MG/L
CREATININE, URINE POC: 300 MG/DL
HBA1C MFR BLD HPLC: 6.8 %
MICROALBUMIN/CREAT RATIO POC: <30 MG/G

## 2019-03-21 RX ORDER — METFORMIN HYDROCHLORIDE 500 MG/1
500 TABLET, EXTENDED RELEASE ORAL
Qty: 30 TAB | Refills: 0 | Status: SHIPPED | OUTPATIENT
Start: 2019-03-21 | End: 2019-04-19 | Stop reason: SDUPTHER

## 2019-03-21 NOTE — PROGRESS NOTES
Visit Vitals /70 (BP 1 Location: Right arm, BP Patient Position: Sitting) Pulse 86 Temp 97.9 °F (36.6 °C) (Oral) Resp 14 Ht 5' 1.52\" (1.563 m) Wt 147 lb 6.4 oz (66.9 kg) SpO2 99% BMI 27.38 kg/m² Chief Complaint Patient presents with Clarence Annual Exam  
  Routine Health Maintenance 1. Have you been to the ER, urgent care clinic since your last visit? Hospitalized since your last visit? December 2018 [facility] ER Fever 2. Have you seen or consulted any other health care providers outside of the [address] [place] [address] since your last visit? Include any pap smears or colon screening. See Above

## 2019-03-21 NOTE — PROGRESS NOTES
Written by Houston Vargas, as dictated by Dr. Lou Evans MD. 
 
[address] [facility] Benigno Jeff is a 50 y.o. female. HPI The patient comes in today for a complete physical examination. She is not fasting for labs today. Last time her CBC was checked, her iron was low at 9. 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 Patient notes that her imaging looks good. The patient is no longer in the [facility] program as she had a reoccurrence, but she is still eating healthy, walking, and in contact with the [place] [address] . Followed by oncology at [facility] and next appointment is in 04/2019. Patient is taking glyburide 2.5 mg as she started having diarrhea, so her oncologist  her to glyburide from metformin. The patient does not regularly check her BS. Pt walks regularly and watches her diet. She has neuropathy in her legs and hands. Gabapentin 900 mg at night does not provide significant relief, but she is able to sleep. She notes that since chemotherapy her ear have sometimes been hurting. Her oncologist told her that this is not related to chemotherapy and she should be examined by her PCP. The patient is taking vitamin D, as it was low when last checked. She is a vegetarian and takes a B complex vitamin. Pt received a flu shot in 10/2018. She has not received a pneumonia vaccine and has not discussed it with her oncologist. 
 
Her last mammogram was last week. She had a complete hysterectomy. Patient Active Problem List  
Diagnosis Code  AR (allergic rhinitis) J30.9  Asthma J45.909  Hypercholesterolemia E78.00  
 S/P laparotomy Z98.890  
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 270 Cap 0  
 ergocalciferol (VITAMIN D2) 50,000 unit capsule Take 1 Cap by mouth every seven (7) days. 52 Cap 0  
 glucose blood VI test strips (ASCENSIA AUTODISC VI, ONE TOUCH ULTRA TEST VI) strip Glucose one touch test strips 100 Strip 11  
 b complex vitamins (B COMPLEX 1) tablet Take 1 Tab by mouth daily.  glucose blood VI test strips (ACCU-CHEK REYNA PLUS TEST STRP) strip Use four times a day and PRN. 100 Strip 5  
 glucose blood VI test strips (ACCU-CHEK ACTIVE TEST) strip Use QID and  Strip 5  Lancets misc Use as directed. 1 Each 11  
 CRANIAL PROSTHESIS misc Cranial Prosthesis Diagnosis code Cpt code 1 Each 2  
 lidocaine-prilocaine (EMLA) topical cream Apply small amount over port area and cover with band aid 1 hour before chemo 30 g 2  
 liposomal DOXOrubicin, DOXIL/LIPODOX, (DOXIL) 2 mg/mL injection by IntraVENous route once.  ALPRAZolam (XANAX) 0.25 mg tablet Take 1 Tab by mouth nightly as needed for Anxiety for up to 15 doses. Max Daily Amount: 0.25 mg. 15 Tab 0  
 metFORMIN ER (GLUCOPHAGE XR) 500 mg tablet TAKE 1 TABLET DAILY WITH DINNER 90 Tab 0  
 cetirizine (ZYRTEC) 10 mg tablet Take  by mouth. No current facility-administered medications on file prior to visit. Allergies Allergen Reactions  Tylenol [Acetaminophen] Swelling Tylenol pm only SLURRED SPEECH. Past Medical History:  
Diagnosis Date  AR (allergic rhinitis) 4/21/2010  Asthma 4/21/2010 ALLERGY RELATED; NONE USED SINCE 2010  Cancer ([facility] OVARIAN AND FALLOPIAN TUBE  DM mellitus, gestational 4/21/2010 PRE DIABETIC  Elevated TSH 4/21/2010  Family history of diabetes mellitus (DM)  History of ovarian cancer 06/2016  Hypercholesterolemia 11/2011  
 elevated particle number  Ill-defined condition 6/16/16 HYDRONEPHROSIS, LEFT RENAL MASS (BEING WORKED UP BY DR Tha Samson.)  Impaired glucose tolerance 12/19/2011  Menopause 06/2016  Other activity BCG vaccine as a child  S/P laparotomy 5/23/2016  Uterine fibroid 4/21/2010 ENDOMETRIOSIS  Vitamin D deficiency 5/18/2010 Past Surgical History:  
Procedure Laterality Date  ABDOMEN SURGERY PROC UNLISTED  05/03/2016 EXP LAP FOR BOWEL OBST.  HX APPENDECTOMY [address]  HX GYN  2/24/16  
 hysterectomy, TUBES REMOVED  (DR DORIAN ANSARI--[facility])  HX HYSTERECTOMY  06/2016  HX OOPHORECTOMY Bilateral 06/2016  HX OTHER SURGICAL  5/16  
 small bowel obstruction, ILEUS POSTOP  
 HX VASCULAR ACCESS    
 PORT A CATH L CHEST WALL  
 HX WISDOM TEETH EXTRACTION    
 age 28 Family History Problem Relation Age of Onset  Diabetes Mother  Diabetes Father  Hypertension Father  Diabetes Maternal Grandmother  Diabetes Maternal Grandfather  No Known Problems Sister  Diabetes Paternal Uncle  Anesth Problems Neg Hx Social History Socioeconomic History  Marital status:  Spouse name: Not on file  Number of children: Not on file  Years of education: Not on file  Highest education level: Not on file Occupational History  Not on file Social Needs  Financial resource strain: Not on file  Food insecurity:  
  Worry: Not on file Inability: Not on file  Transportation needs:  
  Medical: Not on file Non-medical: Not on file Tobacco Use  Smoking status: Never Smoker  Smokeless tobacco: Never Used Substance and Sexual Activity  Alcohol use: No  
 Drug use: No  
 Sexual activity: Yes  
  Partners: Male Lifestyle  Physical activity:  
  Days per week: Not on file Minutes per session: Not on file  Stress: Not on file Relationships  Social connections:  
  Talks on phone: Not on file Gets together: Not on file Attends Islam service: Not on file Active member of club or organization: Not on file Attends meetings of clubs or organizations: Not on file Relationship status: Not on file  Intimate partner violence:  
  Fear of current or ex partner: Not on file Emotionally abused: Not on file Physically abused: Not on file Forced sexual activity: Not on file Other Topics Concern  Not on file Social History Narrative  Not on file Review of Systems Constitutional: Negative for malaise/fatigue. HENT: Positive for ear pain (BL). Negative for congestion. Eyes: Negative for blurred vision and pain. Respiratory: Negative for cough and shortness of breath. Cardiovascular: Negative for chest pain and palpitations. Gastrointestinal: Negative for abdominal pain, constipation, diarrhea and heartburn. Genitourinary: Negative for frequency and urgency. Musculoskeletal: Negative for joint pain and myalgias. Neurological: Positive for tingling (BL upper and lower extremities). Negative for dizziness, sensory change, weakness and headaches. Psychiatric/Behavioral: Negative for depression, memory loss and substance abuse. Visit Vitals /70 (BP 1 Location: Right arm, BP Patient Position: Sitting) Pulse 86 Temp 97.9 °F (36.6 °C) (Oral) Resp 14 Ht 5' 1.52\" (1.563 m) Wt 147 lb 6.4 oz (66.9 kg) LMP 07/05/2013 SpO2 99% BMI 27.38 kg/m² Physical Exam  
Constitutional: She is oriented to person, place, and time. She appears well-developed and well-nourished. No distress. HENT:  
Right Ear: Tympanic membrane, external ear and ear canal normal.  
Left Ear: Tympanic membrane, external ear and ear canal normal.  
Nose: Right sinus exhibits no maxillary sinus tenderness. Left sinus exhibits no maxillary sinus tenderness.   
Mouth/Throat: Oropharynx is clear and moist.  
 Eyes: Conjunctivae and EOM are normal. Right eye exhibits no discharge. Left eye exhibits no discharge. Neck: Normal range of motion. Neck supple. No thyromegaly present. Cardiovascular: Normal rate, regular rhythm and normal heart sounds. Pulses: 
     Dorsalis pedis pulses are 2+ on the right side, and 2+ on the left side. Pulmonary/Chest: Effort normal and breath sounds normal. She has no wheezes. Abdominal: Soft. Bowel sounds are normal. There is no tenderness. Lymphadenopathy:  
  She has no cervical adenopathy. Neurological: She is alert and oriented to person, place, and time. Reflex Scores: 
     Patellar reflexes are 2+ on the right side and 2+ on the left side. Skin: She is not diaphoretic. Psychiatric: She has a normal mood and affect. Her behavior is normal.  
Nursing note and vitals reviewed. Diabetic foot exam:  
 
Left: Vibratory sensation normal  
 Sharp/dull discrimination normal  
 Filament test normal sensation with micro filament Pulse DP: 2+ (normal) Deformities: None Right: Vibratory sensation normal 
 Sharp/dull discrimination normal 
 Filament test normal sensation with micro filament Pulse DP: 2+ (normal) Deformities: None ASSESSMENT and PLAN 
  ICD-10-CM ICD-9-CM 1. Physical exam F92.52 B24.2 METABOLIC PANEL, COMPREHENSIVE  
   CBC W/O DIFF  
   LIPID PANEL  
   TSH 3RD GENERATION Complete physical exam done. Pt will return during lab hours to have basic fasting labs drawn. 2. Controlled type 2 diabetes mellitus without complication, without long-term current use of insulin ([facility]) E11.9 250.00 AMB POC HEMOGLOBIN A1C AMB POC URINE, MICROALBUMIN, SEMIQUANT (3 RESULTS)  DIABETES FOOT EXAM  
   metFORMIN ER (GLUCOPHAGE XR) 500 mg tablet sent to pharmacy. POC HbA1c was 6.8%, increased from 5.6% on 01/26/2018. Discussed that her HbA1c has increased due to chemotherapy.  POC microalbumin tested in office: albumin 30, creatinine 300, microalbumin/creat ratio <30. Foot exam performed. Metformin  mg prescribed. Potential side effects were discussed. Pt should message me via [employer] to let me know if she is having side effects. She should stop taking glyburide 2.5 mg. Encouraged diet and exercise. 3. Vitamin D deficiency E55.9 268.9 VITAMIN D, 25 HYDROXY Vitamin D ordered. She should continue taking vitamin D. 4. 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  
 T45.1X5A E933.1 5. Carcinomatosis ([facility] C80.0 199.0 Followed by oncology and finished chemotherapy. This plan was reviewed with the patient and patient agrees. All questions were answered. This scribe documentation was reviewed by me and accurately reflects the examination and decisions made by me. This note will not be viewable in [address]

## 2019-03-29 LAB
25(OH)D3+25(OH)D2 SERPL-MCNC: 43.7 NG/ML (ref 30–100)
ALBUMIN SERPL-MCNC: 4.3 G/DL (ref 3.5–5.5)
ALBUMIN/GLOB SERPL: 1.1 {RATIO} (ref 1.2–2.2)
ALP SERPL-CCNC: 73 IU/L (ref 39–117)
ALT SERPL-CCNC: 174 IU/L (ref 0–32)
AST SERPL-CCNC: 136 IU/L (ref 0–40)
BILIRUB SERPL-MCNC: 0.5 MG/DL (ref 0–1.2)
BUN SERPL-MCNC: 8 MG/DL (ref 6–24)
BUN/CREAT SERPL: 14 (ref 9–23)
CALCIUM SERPL-MCNC: 9.1 MG/DL (ref 8.7–10.2)
CHLORIDE SERPL-SCNC: 105 MMOL/L (ref 96–106)
CHOLEST SERPL-MCNC: 145 MG/DL (ref 100–199)
CO2 SERPL-SCNC: 23 MMOL/L (ref 20–29)
CREAT SERPL-MCNC: 0.57 MG/DL (ref 0.57–1)
ERYTHROCYTE [DISTWIDTH] IN BLOOD BY AUTOMATED COUNT: 13.6 % (ref 12.3–15.4)
GLOBULIN SER CALC-MCNC: 4 G/DL (ref 1.5–4.5)
GLUCOSE SERPL-MCNC: 137 MG/DL (ref 65–99)
HCT VFR BLD AUTO: 34.2 % (ref 34–46.6)
HDLC SERPL-MCNC: 33 MG/DL
HGB BLD-MCNC: 11.1 G/DL (ref 11.1–15.9)
LDLC SERPL CALC-MCNC: 87 MG/DL (ref 0–99)
MCH RBC QN AUTO: 32.8 PG (ref 26.6–33)
MCHC RBC AUTO-ENTMCNC: 32.5 G/DL (ref 31.5–35.7)
MCV RBC AUTO: 101 FL (ref 79–97)
PLATELET # BLD AUTO: 143 X10E3/UL (ref 150–379)
POTASSIUM SERPL-SCNC: 4 MMOL/L (ref 3.5–5.2)
PROT SERPL-MCNC: 8.3 G/DL (ref 6–8.5)
RBC # BLD AUTO: 3.38 X10E6/UL (ref 3.77–5.28)
SODIUM SERPL-SCNC: 144 MMOL/L (ref 134–144)
TRIGL SERPL-MCNC: 127 MG/DL (ref 0–149)
TSH SERPL DL<=0.005 MIU/L-ACNC: 2.67 UIU/ML (ref 0.45–4.5)
VLDLC SERPL CALC-MCNC: 25 MG/DL (ref 5–40)
WBC # BLD AUTO: 4.2 X10E3/UL (ref 3.4–10.8)

## 2019-03-31 DIAGNOSIS — R74.8 ELEVATED LIVER ENZYMES: Primary | ICD-10-CM

## 2019-04-01 NOTE — PROGRESS NOTES
Soraya, your liver enzymes came back high. Not sure if it is due to chemotherapy. I would suggest repeating in 3 weeks if still elevated then we have to stop Metformin & will do liver imaging. You don`t need an appointment for blood work . I will put an order , just stop by in 3 weeks & get your blood work done.

## 2019-04-19 DIAGNOSIS — E11.9 CONTROLLED TYPE 2 DIABETES MELLITUS WITHOUT COMPLICATION, WITHOUT LONG-TERM CURRENT USE OF INSULIN (HCC): ICD-10-CM

## 2019-04-19 RX ORDER — METFORMIN HYDROCHLORIDE 500 MG/1
500 TABLET, EXTENDED RELEASE ORAL
Qty: 30 TAB | Refills: 0 | Status: SHIPPED | OUTPATIENT
Start: 2019-04-19 | End: 2019-05-19

## 2019-05-06 DIAGNOSIS — E11.9 WELL CONTROLLED DIABETES MELLITUS (HCC): ICD-10-CM

## 2019-05-06 RX ORDER — METFORMIN HYDROCHLORIDE 500 MG/1
500 TABLET, EXTENDED RELEASE ORAL
Qty: 90 TAB | Refills: 0 | Status: SHIPPED | OUTPATIENT
Start: 2019-05-06 | End: 2019-05-16 | Stop reason: SDUPTHER

## 2019-05-16 DIAGNOSIS — E11.9 WELL CONTROLLED DIABETES MELLITUS (HCC): ICD-10-CM

## 2019-05-16 DIAGNOSIS — E11.9 CONTROLLED TYPE 2 DIABETES MELLITUS WITHOUT COMPLICATION, WITHOUT LONG-TERM CURRENT USE OF INSULIN (HCC): ICD-10-CM

## 2019-05-16 RX ORDER — METFORMIN HYDROCHLORIDE 500 MG/1
500 TABLET, EXTENDED RELEASE ORAL
Qty: 90 TAB | Refills: 0 | Status: SHIPPED | OUTPATIENT
Start: 2019-05-16 | End: 2019-08-14

## 2019-05-16 NOTE — TELEPHONE ENCOUNTER
Called and spoke with patient, she would like RX sent to [address] [place] [address]. Sent to Dr. Stephen Palacios as a refill request with requested pharmacy changes. End of encounter.

## 2019-05-16 NOTE — TELEPHONE ENCOUNTER
Called and spoke to patient and confirmed that she would like for supply to go to [place] [address] mail order. Refill request reflects request. End of encounter.

## 2019-06-19 NOTE — TELEPHONE ENCOUNTER
Been requesting medical records since November with no results. Need office note for 5/23/16. Informed Claudene Guillaume I spoke with tone request sent to [facility] and confirmation received. Physical Therapy Daily Treatment Note    Date:  2019    Patient Name:  Dee Zaragoza    :  1973  MRN: [id number]  Restrictions/Precautions:    Medical/Treatment Diagnosis Information:  · Diagnosis: Pars with spondylolisthesis, lumbar spondylosis  Insurance/Certification information:  PT Insurance Information: [employer]  Physician Information:  Referring Practitioner: Monet Damon  Plan of care signed (Y/N):  Y  (8 visits -19)  Visit# / total visits:      G-Code (if applicable): Modified Oswestry Score   At initial evaluation 30% disability     Time in:  6:58      Timed Treatment: 45 Total Treatment Time:  55  ________________________________________________________________________________________    Pain Level:    0/10  SUBJECTIVE:  Pt reports having soreness after standing for 45 mins to an hour. OBJECTIVE:     Exercise/Equipment Resistance/Repetitions Other comments          TA sets with BP cuff       With march       With KFO    HEP video    Bridging 10x5 secs  HEP video    Prone hip extension  HEP video    Lumbo-pelvic stabilization   x5 mins     Clamshells 2  HEP video           Supine hip IR/ER neuro re-ed    x5 mins B    Seated multifidus    HEP video    Standing multifidus  x15 B maroon TB     Quadruped Superman    x10 B  HEP video    Magnetic Click  To fatigue  HEP video    Supine Single Leg Circles   x10 B CCW/CC HEP (next visit)   S' extension   x15 B maroon TB            TG    6 mins                           Other Therapeutic Activities:  Posture re-ed training x8 mins. Sleeping with pillows between knees     Manual Treatments:       B LAD and SAD. B LE pulling. Hip IR/ER neuro re-ed with resistance. L hip hit technique followed by hip ER MWM. Modalities:      Test/Measurements:         ASSESSMENT:   Progress POC as patient tolerates. Treatment/Activity Tolerance:   XPatient tolerated treatment well ? Patient limited by fatique  ? Patient limited by pain ? Patient limited by other medical complications  ? Other:     Goals:       Long term goals  Time Frame for Long term goals : 4 weeks   Long term goal 1: Pt independent with HEP   Long term goal 2: Pt B gluteal and hip strength to improve by 1/3 muscle grade  Long term goal 3: Pt sit and stand at least 30 mins without limits to pain  Long term goal 4: Pt return to 90% PLOF without limits to pain. Plan: X Continue per plan of care ? Alter current plan (see comments)   ? Plan of care initiated ? Hold pending MD visit ?  Discharge      Plan for Next Session:      Re-Certification Due Date:         Signature:  Sammy Whitaker

## 2019-06-21 ENCOUNTER — OFFICE VISIT (OUTPATIENT)
Dept: PRIMARY CARE CLINIC | Age: 49
End: 2019-06-21

## 2019-06-21 VITALS
HEIGHT: 62 IN | HEART RATE: 83 BPM | SYSTOLIC BLOOD PRESSURE: 113 MMHG | WEIGHT: 140 LBS | OXYGEN SATURATION: 98 % | BODY MASS INDEX: 25.76 KG/M2 | TEMPERATURE: 98 F | RESPIRATION RATE: 18 BRPM | DIASTOLIC BLOOD PRESSURE: 70 MMHG

## 2019-06-21 DIAGNOSIS — R59.0 ANTERIOR CERVICAL ADENOPATHY: ICD-10-CM

## 2019-06-21 DIAGNOSIS — G62.9 NEUROPATHY: ICD-10-CM

## 2019-06-21 DIAGNOSIS — M79.89 SWELLING OF ARM: Primary | ICD-10-CM

## 2019-06-21 DIAGNOSIS — R68.84 JAW PAIN: ICD-10-CM

## 2019-06-21 DIAGNOSIS — C57.02 CARCINOMA OF LEFT FALLOPIAN TUBE (HCC): ICD-10-CM

## 2019-06-21 NOTE — PROGRESS NOTES
Written by Thiago Stanley, as dictated by Sultana Hudson MD.     Beto [address] Tramaine is a 52 y.o. female. HPI   Pt c/o of unusual pain on neck/jaw and swelling in L arm. Pt denies f/c, cough, or seasonal allergies. She sees a dentist regularly and was never advised to wear night guards. She has been taking gabapentin 900mg every night which provides some relief. She notes taking a lower dose, like 600mg does not provide relief. Pt's oncologist recommended she follow up with her PCP regarding the above complaint. Pt is not currently undergoing chemotherapy. She had a PET scan in February that was normal, per pt. Pt's weight in the office today is 140lb, down from 147lb on 3/21/2019. She reports she is not trying to lose weight. Pt is compliant taking metformin, melatonin, Vit D, and B complex. 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 90 Tab 0    gabapentin (NEURONTIN) 300 mg capsule Take 1 Cap by mouth three (3) times daily. 270 Cap 0    ergocalciferol (VITAMIN D2) 50,000 unit capsule Take 1 Cap by mouth every seven (7) days. 52 Cap 0    glucose blood VI test strips (ASCENSIA AUTODISC VI, ONE TOUCH ULTRA TEST VI) strip Glucose one touch test strips 100 Strip 11    b complex vitamins (B COMPLEX 1) tablet Take 1 Tab by mouth daily.  glucose blood VI test strips (ACCU-CHEK REYNA PLUS TEST STRP) strip Use four times a day and PRN.  100 Strip 5    glucose blood VI test strips (ACCU-CHEK ACTIVE TEST) strip Use QID and  Strip 5    Lancets misc Use as directed. 1 Each 11    liposomal DOXOrubicin, DOXIL/LIPODOX, (DOXIL) 2 mg/mL injection by IntraVENous route once.  cetirizine (ZYRTEC) 10 mg tablet Take  by mouth.  CRANIAL PROSTHESIS misc Cranial Prosthesis  Diagnosis code  Cpt code 1 Each 2    lidocaine-prilocaine (EMLA) topical cream Apply small amount over port area and cover with band aid 1 hour before chemo 30 g 2     No current facility-administered medications on file prior to visit. Allergies   Allergen Reactions    Tylenol [Acetaminophen] Swelling     Tylenol pm only  SLURRED SPEECH. Past Medical History:   Diagnosis Date    AR (allergic rhinitis) 2010    Asthma 2010    ALLERGY RELATED; NONE USED SINCE     Cancer [facility])     OVARIAN AND FALLOPIAN TUBE    DM mellitus, gestational 2010    PRE DIABETIC    Elevated TSH 2010    Family history of diabetes mellitus (DM)     History of ovarian cancer 2016    Hypercholesterolemia 2011    elevated particle number    Ill-defined condition 16    HYDRONEPHROSIS, LEFT RENAL MASS (BEING WORKED UP BY DR MC Andersen, UR[facility].)    Impaired glucose tolerance 2011    Menopause 2016    Other activity     BCG vaccine as a child    S/P laparotomy 2016    Uterine fibroid 2010    ENDOMETRIOSIS    Vitamin D deficiency 2010       Past Surgical History:   Procedure Laterality Date    ABDOMEN SURGERY PROC UNLISTED  2016    EXP LAP FOR BOWEL OBST.     HX APPENDECTOMY      HX  SECTION  ,     HX GYN  16    hysterectomy, TUBES REMOVED  (DR DORIAN ANSARI--[facility])    HX HYSTERECTOMY  2016    HX OOPHORECTOMY Bilateral 2016    HX OTHER SURGICAL      small bowel obstruction, ILEUS POSTOP    HX VASCULAR ACCESS      PORT A CATH L CHEST WALL    HX WISDOM TEETH EXTRACTION      age 28       Family History   Problem Relation Age of Onset    Diabetes Mother     Diabetes Father    [place] Hypertension Father     Diabetes Maternal Grandmother     Diabetes Maternal Grandfather     No Known Problems Sister     Diabetes Paternal Uncle     Anesth Problems Neg Hx        Social History     Socioeconomic History    Marital status:      Spouse name: Not on file    Number of children: Not on file    Years of education: Not on file    Highest education level: Not on file   Occupational History    Not on file   Social Needs    Financial resource strain: Not on file    Food insecurity:     Worry: Not on file     Inability: Not on file    Transportation needs:     Medical: Not on file     Non-medical: Not on file   Tobacco Use    Smoking status: Never Smoker    Smokeless tobacco: Never Used   Substance and Sexual Activity    Alcohol use: No    Drug use: No    Sexual activity: Yes     Partners: Male   Lifestyle    Physical activity:     Days per week: Not on file     Minutes per session: Not on file    Stress: Not on file   Relationships    Social connections:     Talks on phone: Not on file     Gets together: Not on file     Attends Faith service: Not on file     Active member of club or organization: Not on file     Attends meetings of clubs or organizations: Not on file     Relationship status: Not on file    Intimate partner violence:     Fear of current or ex partner: Not on file     Emotionally abused: Not on file     Physically abused: Not on file     Forced sexual activity: Not on file   Other Topics Concern    Not on file   Social History Narrative    Not on file       No visits with results within 3 Month(s) from this visit.    Latest known visit with results is:   Office Visit on 03/21/2019   Component Date Value Ref Range Status    Hemoglobin A1c (POC) 03/21/2019 6.8  % Final    ALBUMIN, URINE POC 03/21/2019 30  Negative mg/L Final    CREATININE, URINE POC 03/21/2019 300  mg/dL Final    Microalbumin/creat ratio (POC) 03/21/2019 <30  <30 MG/G Final    Glucose 03/28/2019 137* 65 - 99 mg/dL Final    BUN 03/28/2019 8  6 - 24 mg/dL Final    Creatinine 03/28/2019 0.57  0.57 - 1.00 mg/dL Final    GFR est non-AA 03/28/2019 110  >59 mL/min/1.73 Final    GFR est AA 03/28/2019 127  >59 mL/min/1.73 Final    BUN/Creatinine ratio 03/28/2019 14  9 - 23 Final    Sodium 03/28/2019 144  134 - 144 mmol/L Final    Potassium 03/28/2019 4.0  3.5 - 5.2 mmol/L Final    Chloride 03/28/2019 105  96 - 106 mmol/L Final    CO2 03/28/2019 23  20 - 29 mmol/L Final    Calcium 03/28/2019 9.1  8.7 - 10.2 mg/dL Final    Protein, total 03/28/2019 8.3  6.0 - 8.5 g/dL Final    Albumin 03/28/2019 4.3  3.5 - 5.5 g/dL Final    GLOBULIN, TOTAL 03/28/2019 4.0  1.5 - 4.5 g/dL Final    A-G Ratio 03/28/2019 1.1* 1.2 - 2.2 Final    Bilirubin, total 03/28/2019 0.5  0.0 - 1.2 mg/dL Final    Alk. phosphatase 03/28/2019 73  39 - 117 IU/L Final    AST (SGOT) 03/28/2019 136* 0 - 40 IU/L Final    ALT (SGPT) 03/28/2019 174* 0 - 32 IU/L Final    WBC 03/28/2019 4.2  3.4 - 10.8 x10E3/uL Final    RBC 03/28/2019 3.38* 3.77 - 5.28 x10E6/uL Final    HGB 03/28/2019 11.1  11.1 - 15.9 g/dL Final    HCT 03/28/2019 34.2  34.0 - 46.6 % Final    MCV 03/28/2019 101* 79 - 97 fL Final    MCH 03/28/2019 32.8  26.6 - 33.0 pg Final    MCHC 03/28/2019 32.5  31.5 - 35.7 g/dL Final    RDW 03/28/2019 13.6  12.3 - 15.4 % Final    PLATELET 58/33/0917 183* 150 - 379 x10E3/uL Final    Cholesterol, total 03/28/2019 145  100 - 199 mg/dL Final    Triglyceride 03/28/2019 127  0 - 149 mg/dL Final    HDL Cholesterol 03/28/2019 33* >39 mg/dL Final    VLDL, calculated 03/28/2019 25  5 - 40 mg/dL Final    LDL, calculated 03/28/2019 87  0 - 99 mg/dL Final    TSH 03/28/2019 2.670  0.450 - 4.500 uIU/mL Final    VITAMIN D, 25-HYDROXY 03/28/2019 43.7  30.0 - 100.0 ng/mL Final               Review of Systems   Constitutional: Positive for weight loss (unintentional). Negative for malaise/fatigue. HENT: Negative for congestion. Eyes: Negative for blurred vision and pain. Respiratory: Negative for cough and shortness of breath. Cardiovascular: Negative for chest pain and palpitations. Gastrointestinal: Negative for abdominal pain and heartburn. Genitourinary: Negative for frequency and urgency. Musculoskeletal: Negative for myalgias. +jaw pain  +swelling in L arm    Neurological: Negative for dizziness, tingling, sensory change, weakness and headaches. Psychiatric/Behavioral: Negative for depression, memory loss and substance abuse. Visit Vitals  /70 (BP 1 Location: Left arm, BP Patient Position: Sitting)   Pulse 83   Temp 98 °F (36.7 °C) (Oral)   Resp 18   Ht 5' 1.5\" (1.562 m)   Wt 140 lb (63.5 kg)   LMP 07/05/2013   SpO2 98%   BMI 26.02 kg/m²         Physical Exam   Constitutional: She is oriented to person, place, and time. She appears well-developed and well-nourished. No distress. HENT:   Right Ear: External ear normal.   Left Ear: External ear normal.   Eyes: Conjunctivae and EOM are normal. Right eye exhibits no discharge. Left eye exhibits no discharge. Neck: Normal range of motion. Neck supple. Cardiovascular: Normal rate and regular rhythm. Pulmonary/Chest: Effort normal and breath sounds normal. She has no wheezes. Abdominal: Soft. Bowel sounds are normal. There is no tenderness. Musculoskeletal:   +swelling L forearm    Lymphadenopathy:     She has cervical adenopathy. Neurological: She is alert and oriented to person, place, and time. Skin: She is not diaphoretic. Psychiatric: She has a normal mood and affect. Her behavior is normal.   Nursing note and vitals reviewed. ASSESSMENT and PLAN    ICD-10-CM ICD-9-CM    1. Swelling of arm M79.89 729.81 Will monitor for any changes or improvements No sign of infection. No need for Abx or any pain medication. 2. Jaw pain R68.84 784.92 . If symptoms do not improve or worsen, pt may call back or return to office. Todl her could be due to teeth grinding at night. Should ask Dentist about mouth guard. 3. Anterior cervical adenopathy R59.0 785. 6 Will monitor for any changes or improvements. recommended taking Claritin or Allegra and gargling salt water  If symptoms do not improve or worsen, pt may call back or return to office. 4. 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 5. Neuropathy G62.9 355.9 Pain has improved significantly since she has started Gabapentin 900 mg at night. This plan was reviewed with the patient and patient agrees. All questions were answered. This scribe documentation was reviewed by me and accurately reflects the examination and decisions made by me. This note will not be viewable in [address]

## 2019-11-12 DIAGNOSIS — E11.9 WELL CONTROLLED DIABETES MELLITUS (HCC): ICD-10-CM

## 2019-11-12 RX ORDER — METFORMIN HYDROCHLORIDE 500 MG/1
TABLET, EXTENDED RELEASE ORAL
Qty: 90 TAB | Refills: 0 | Status: SHIPPED | OUTPATIENT
Start: 2019-11-12 | End: 2020-02-11 | Stop reason: ALTCHOICE

## 2020-02-11 ENCOUNTER — OFFICE VISIT (OUTPATIENT)
Dept: PRIMARY CARE CLINIC | Age: 50
End: 2020-02-11

## 2020-02-11 VITALS
HEART RATE: 116 BPM | HEIGHT: 62 IN | OXYGEN SATURATION: 98 % | SYSTOLIC BLOOD PRESSURE: 130 MMHG | DIASTOLIC BLOOD PRESSURE: 89 MMHG | BODY MASS INDEX: 26.2 KG/M2 | RESPIRATION RATE: 16 BRPM | WEIGHT: 142.4 LBS | TEMPERATURE: 98 F

## 2020-02-11 DIAGNOSIS — E11.9 CONTROLLED TYPE 2 DIABETES MELLITUS WITHOUT COMPLICATION, WITHOUT LONG-TERM CURRENT USE OF INSULIN (HCC): ICD-10-CM

## 2020-02-11 DIAGNOSIS — F51.01 PRIMARY INSOMNIA: ICD-10-CM

## 2020-02-11 DIAGNOSIS — C80.0 CARCINOMATOSIS (HCC): ICD-10-CM

## 2020-02-11 DIAGNOSIS — C57.02 CARCINOMA OF LEFT FALLOPIAN TUBE (HCC): Primary | ICD-10-CM

## 2020-02-11 RX ORDER — GABAPENTIN 600 MG/1
600 TABLET ORAL DAILY
COMMUNITY
Start: 2019-07-15

## 2020-02-11 RX ORDER — TALC
250 POWDER (GRAM) TOPICAL DAILY
COMMUNITY
Start: 2019-10-08

## 2020-02-11 RX ORDER — CHOLECALCIFEROL (VITAMIN D3) 125 MCG
5 CAPSULE ORAL
COMMUNITY

## 2020-02-11 RX ORDER — INSULIN PUMP SYRINGE, 3 ML
EACH MISCELLANEOUS
Qty: 1 KIT | Refills: 0 | Status: SHIPPED | OUTPATIENT
Start: 2020-02-11

## 2020-02-11 RX ORDER — INSULIN GLARGINE 100 [IU]/ML
INJECTION, SOLUTION SUBCUTANEOUS
Qty: 15 ML | Refills: 0 | Status: SHIPPED | OUTPATIENT
Start: 2020-02-11 | End: 2020-02-26 | Stop reason: SDUPTHER

## 2020-02-11 RX ORDER — NORTRIPTYLINE HYDROCHLORIDE 25 MG/1
25 CAPSULE ORAL
COMMUNITY
Start: 2019-12-30

## 2020-02-11 RX ORDER — METFORMIN HYDROCHLORIDE 850 MG/1
850 TABLET ORAL 2 TIMES DAILY WITH MEALS
Qty: 60 TAB | Refills: 2 | Status: SHIPPED | OUTPATIENT
Start: 2020-02-11 | End: 2020-05-04

## 2020-02-11 RX ORDER — ONDANSETRON 8 MG/1
TABLET, ORALLY DISINTEGRATING ORAL
COMMUNITY
Start: 2019-11-12

## 2020-02-11 NOTE — PROGRESS NOTES
Chief Complaint   Patient presents with    High Blood Sugar     1. Have you been to the ER, urgent care clinic since your last visit? Hospitalized since your last visit? No    2. Have you seen or consulted any other health care providers outside of the [address] [place] [address] since your last visit? Include any pap smears or colon screening. [facility] ovary cancer. 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   Pt is in clinical trial.

## 2020-02-11 NOTE — PROGRESS NOTES
Written by Inez Dickey, as dictated by Dr. Lynnette Ricks MD.    Tiffanie Rangel is a 52 y.o. female. HPI  The patient presents today c/o high blood sugar. She reports her fasting BS more recently was elevated at 206-236. Compliant on Metformin 500 mg BID. She would like to make sure her BS is under control, and is willing to take insulin. Her blood work is done regularly for the trial.    She is currently in Clinical Trials at [facility] for her cancer, which  is due to her fallopian tube cancer, and will start the medications and chemotherapy in 02/13/2020. She will also have biopsy done three times during the study. She is taking Nortriptyline 25 mg, Gabapentin 600 mg and Melatonin, which helps with her neuropathy and sleep. She reports her AST was elevated, but not high. Her most recent HGB was 12.5. Patient Active Problem List   Diagnosis Code    AR (allergic rhinitis) J30.9    Asthma J45.909    Hypercholesterolemia E78.00    S/P laparotomy Z98.890    Carcinomatosis ([facility] [address] C80.0    Fallopian tube cancer, carcinoma ([facility] [address] C57.00        Current Outpatient Medications on File Prior to Visit   Medication Sig Dispense Refill    magnesium oxide 250 mg magnesium tablet Take 250 mg by mouth daily.  melatonin 5 mg tablet Take 5 mg by mouth nightly.  ondansetron (ZOFRAN ODT) 8 mg disintegrating tablet       metFORMIN ER (GLUCOPHAGE XR) 500 mg tablet TAKE 1 TABLET BY MOUTH ONCE DAILY WITH DINNER 90 Tab 0    glucose blood VI test strips (ASCENSIA AUTODISC VI, ONE TOUCH ULTRA TEST VI) strip Glucose one touch test strips 100 Strip 11    b complex vitamins (B COMPLEX 1) tablet Take 1 Tab by mouth daily.  glucose blood VI test strips (ACCU-CHEK REYNA PLUS TEST STRP) strip Use four times a day and PRN. 100 Strip 5    glucose blood VI test strips (ACCU-CHEK ACTIVE TEST) strip Use QID and  Strip 5    Lancets misc Use as directed.  1 Each 11    gabapentin (NEURONTIN) 300 mg capsule Take 1 Cap by mouth three (3) times daily. (Patient not taking: Reported on 2020) 270 Cap 0    liposomal DOXOrubicin, DOXIL/LIPODOX, (DOXIL) 2 mg/mL injection by IntraVENous route once.  [DISCONTINUED] ergocalciferol (VITAMIN D2) 50,000 unit capsule Take 1 Cap by mouth every seven (7) days. 52 Cap 0    cetirizine (ZYRTEC) 10 mg tablet Take  by mouth.  CRANIAL PROSTHESIS misc Cranial Prosthesis  Diagnosis code  Cpt code 1 Each 2    lidocaine-prilocaine (EMLA) topical cream Apply small amount over port area and cover with band aid 1 hour before chemo 30 g 2     No current facility-administered medications on file prior to visit. Allergies   Allergen Reactions    Diphenhydramine-Acetaminophen Rash    Tylenol [Acetaminophen] Swelling     Tylenol pm only  SLURRED SPEECH. Past Medical History:   Diagnosis Date    AR (allergic rhinitis) 2010    Asthma 2010    ALLERGY RELATED; NONE USED SINCE     Cancer [facility])     OVARIAN AND FALLOPIAN TUBE    Cancer ([facility] [address] 2019    ovarian cancer matasticized to bladder.  DM mellitus, gestational 2010    PRE DIABETIC    Elevated TSH 2010    Family history of diabetes mellitus (DM)     History of ovarian cancer 2016    Hypercholesterolemia 2011    elevated particle number    Ill-defined condition 16    HYDRONEPHROSIS, LEFT RENAL MASS (BEING WORKED UP BY DR MC Thompson)    Impaired glucose tolerance 2011    Menopause 2016    Other activity     BCG vaccine as a child    S/P laparotomy 2016    Uterine fibroid 2010    ENDOMETRIOSIS    Vitamin D deficiency 2010       Past Surgical History:   Procedure Laterality Date    ABDOMEN SURGERY PROC UNLISTED  2016    EXP LAP FOR BOWEL OBST.     HX APPENDECTOMY      HX  SECTION  ,     HX GYN  16    hysterectomy, TUBES REMOVED  (DR DORIAN ANSARI--[facility])    HX HYSTERECTOMY 06/2016    HX OOPHORECTOMY Bilateral 06/2016    HX OTHER SURGICAL  5/16    small bowel obstruction, ILEUS POSTOP    HX VASCULAR ACCESS      PORT A CATH L CHEST WALL    HX WISDOM TEETH EXTRACTION      age 28       Family History   Problem Relation Age of Onset    Diabetes Mother     Diabetes Father     Hypertension Father     Diabetes Maternal Grandmother     Diabetes Maternal Grandfather     No Known Problems Sister     Diabetes Paternal Uncle     Anesth Problems Neg Hx        Social History     Socioeconomic History    Marital status:      Spouse name: Not on file    Number of children: Not on file    Years of education: Not on file    Highest education level: Not on file   Occupational History    Not on file   Social Needs    Financial resource strain: Not on file    Food insecurity:     Worry: Not on file     Inability: Not on file    Transportation needs:     Medical: Not on file     Non-medical: Not on file   Tobacco Use    Smoking status: Never Smoker    Smokeless tobacco: Never Used   Substance and Sexual Activity    Alcohol use: No    Drug use: No    Sexual activity: Yes     Partners: Male   Lifestyle    Physical activity:     Days per week: Not on file     Minutes per session: Not on file    Stress: Not on file   Relationships    Social connections:     Talks on phone: Not on file     Gets together: Not on file     Attends Shinto service: Not on file     Active member of club or organization: Not on file     Attends meetings of clubs or organizations: Not on file     Relationship status: Not on file    Intimate partner violence:     Fear of current or ex partner: Not on file     Emotionally abused: Not on file     Physically abused: Not on file     Forced sexual activity: Not on file   Other Topics Concern    Not on file   Social History Narrative    Not on file       Review of Systems   Constitutional: Negative for malaise/fatigue and weight loss.    Respiratory: Negative for cough and shortness of breath. Genitourinary: Negative for dysuria, frequency and urgency. Musculoskeletal: Negative for joint pain and myalgias. Neurological: Negative for dizziness and headaches. Psychiatric/Behavioral: Negative for depression and substance abuse. The patient has insomnia (well-managed). The patient is not nervous/anxious. Visit Vitals  /89 (BP 1 Location: Right arm, BP Patient Position: Sitting)   Pulse (!) 116   Temp 98 °F (36.7 °C) (Oral)   Resp 16   Ht 5' 1.5\" (1.562 m)   Wt 142 lb 6.4 oz (64.6 kg)   LMP 07/05/2013   SpO2 98%   BMI 26.47 kg/m²       Physical Exam  Vitals signs and nursing note reviewed. Constitutional:       General: She is not in acute distress. Appearance: Normal appearance. She is well-developed, well-groomed and overweight. She is not diaphoretic. HENT:      Head: Normocephalic and atraumatic. Right Ear: External ear normal.      Left Ear: External ear normal.   Eyes:      General:         Right eye: No discharge. Left eye: No discharge. Conjunctiva/sclera: Conjunctivae normal.      Pupils: Pupils are equal, round, and reactive to light. Neck:      Musculoskeletal: Normal range of motion and neck supple. Cardiovascular:      Rate and Rhythm: Normal rate and regular rhythm. Heart sounds: Normal heart sounds. No murmur. No friction rub. No gallop. Pulmonary:      Effort: Pulmonary effort is normal.      Breath sounds: Normal breath sounds. No wheezing. Abdominal:      General: Bowel sounds are normal.      Palpations: Abdomen is soft. Tenderness: There is no abdominal tenderness. Musculoskeletal: Normal range of motion. Neurological:      Mental Status: She is alert and oriented to person, place, and time. Deep Tendon Reflexes: Reflexes are normal and symmetric. Psychiatric:         Behavior: Behavior normal.         Thought Content:  Thought content normal.         ASSESSMENT and PLAN ICD-10-CM ICD-9-CM    1. 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 2. Carcinomatosis ([facility] [address] C80.0 199.0 Patient is going to clinical trials at [facility]. 3. Controlled type 2 diabetes mellitus without complication, without long-term current use of insulin (HCC) E11.9 250.00 HEMOGLOBIN A1C WITH EAG      insulin glargine (LANTUS,BASAGLAR) 100 unit/mL (3 mL) inpn sent to pharmacy. metFORMIN (GLUCOPHAGE) 850 mg tablet sent to pharmacy. Blood-Glucose Meter monitoring kit sent to pharmacy. Hemoglobin A1c ordered. Insulin inpn prescribed. Potential side effects were discussed. If BS is >180 in the mornings or evenings, pt should take Lantus 8 units at night. Metformin 850 mg prescribed. Pt should take 1 tab BID. Increased dosage as BS is elevated. Blood-Glucose Meter monitoring kit and Glucose Blood VI Test Strips prescribed. Pt should monitor her BP daily, at different times of day. 4. Primary insomnia F51.01 307.42 Reviewed and decided to continue present medications. Insomnia is well managed at this time with Nortriptyline and gabapentin. This plan was reviewed with the patient and patient agrees. All questions were answered. This scribe documentation was reviewed by me and accurately reflects the examination and decisions made by me. This note will not be viewable in [address]

## 2020-02-12 ENCOUNTER — PATIENT MESSAGE (OUTPATIENT)
Dept: PRIMARY CARE CLINIC | Age: 50
End: 2020-02-12

## 2020-02-12 LAB
EST. AVERAGE GLUCOSE BLD GHB EST-MCNC: 192 MG/DL
HBA1C MFR BLD: 8.3 % (ref 4.8–5.6)

## 2020-02-13 RX ORDER — PEN NEEDLE, DIABETIC 31 GX3/16"
NEEDLE, DISPOSABLE MISCELLANEOUS
Qty: 100 PEN NEEDLE | Refills: 2 | Status: SHIPPED | OUTPATIENT
Start: 2020-02-13 | End: 2020-07-09 | Stop reason: SDUPTHER

## 2020-02-13 NOTE — TELEPHONE ENCOUNTER
From: Shana Smith  Sent: 2/13/2020 8:58 AM EST  To: [facility]  Subject: RE: Update Medical Information    Good morning! I did start Metformin 850mg/twice a day. I was not able to use insulin because I don't have a disposal needle. Please could you send in the prescription for the needles? Thank you,  Soraya  ----- Message -----  From: Celestino Victor MD  Sent: 2/13/2020 7:59 AM EST  To: Soraya Gibson  Subject: RE: Update Medical Information  Lets start insulin & see if it brings it down. You have started Metformin 850 mg twice a day correct?      ----- Message -----   From: Shana Smith   Sent: 2/12/2020 11:46 PM EST   To: Celestino Victor MD  Subject: RE: Update Medical Information    Hello,  I didn't start taking insulin yet. I will start tomorrow morning before breakfast. My sugar levels are still high, fasting was 197 mg and after lunch and dinner >200 mg. Please let me know if there is anything else I should do to get the sugar levels down.   Thank you,  Soraya

## 2020-02-13 NOTE — PROGRESS NOTES
Soraya, your HbA1C came back pretty high. Have you started a Lantus ? How are blood sugar readings now?

## 2020-02-26 DIAGNOSIS — E11.9 CONTROLLED TYPE 2 DIABETES MELLITUS WITHOUT COMPLICATION, WITHOUT LONG-TERM CURRENT USE OF INSULIN (HCC): ICD-10-CM

## 2020-02-26 RX ORDER — INSULIN GLARGINE 100 [IU]/ML
INJECTION, SOLUTION SUBCUTANEOUS
Qty: 15 ML | Refills: 0 | Status: SHIPPED | OUTPATIENT
Start: 2020-02-26 | End: 2020-05-07 | Stop reason: SDUPTHER

## 2020-02-27 ENCOUNTER — TELEPHONE (OUTPATIENT)
Dept: PRIMARY CARE CLINIC | Age: 50
End: 2020-02-27

## 2020-02-27 NOTE — TELEPHONE ENCOUNTER
Called and spoke with patient's daughter and advised Lantus pen was sent to pharmacy yesterday and to check with pharmacy. Advised that is they still needed assistance, call the office. End of encounter.

## 2020-02-28 ENCOUNTER — TELEPHONE (OUTPATIENT)
Dept: PRIMARY CARE CLINIC | Age: 50
End: 2020-02-28

## 2020-02-28 DIAGNOSIS — E11.9 CONTROLLED TYPE 2 DIABETES MELLITUS WITHOUT COMPLICATION, WITHOUT LONG-TERM CURRENT USE OF INSULIN (HCC): ICD-10-CM

## 2020-02-28 RX ORDER — INSULIN GLARGINE 100 [IU]/ML
INJECTION, SOLUTION SUBCUTANEOUS
Qty: 15 ML | Refills: 0 | Status: CANCELLED | OUTPATIENT
Start: 2020-02-28

## 2020-02-28 NOTE — TELEPHONE ENCOUNTER
Called and spoke with patient and advised that they were requiring a PA on the Lantus that was in process, but I would call pharmacy and see what the issue was as it was previously approved. She is agreeable to this. End of encounter. Called and spoke with Pharmacist Adama Barkley and he advises insurance is requiring PA because it was sent brand. Telephone order given per Dr. Doroteo Cabello Pen, 20 units daily (Patient advised to take 12 units every AM and 8 units every PM) . States he was able to put through no issues, co-pay $25 for patient. 30 D 2 RF. Called patient and advised of above. Let her know pharmacist states this will be available tonight. Reminded of new dosage instructions, and she verbalizes understanding. Encouraged to call as needed. End of encounter.

## 2020-05-04 DIAGNOSIS — E11.9 CONTROLLED TYPE 2 DIABETES MELLITUS WITHOUT COMPLICATION, WITHOUT LONG-TERM CURRENT USE OF INSULIN (HCC): ICD-10-CM

## 2020-05-04 RX ORDER — METFORMIN HYDROCHLORIDE 850 MG/1
TABLET ORAL
Qty: 180 TAB | Refills: 0 | Status: SHIPPED | OUTPATIENT
Start: 2020-05-04 | End: 2020-08-06

## 2020-05-07 DIAGNOSIS — E11.9 CONTROLLED TYPE 2 DIABETES MELLITUS WITHOUT COMPLICATION, WITHOUT LONG-TERM CURRENT USE OF INSULIN (HCC): ICD-10-CM

## 2020-05-07 RX ORDER — INSULIN GLARGINE 100 [IU]/ML
INJECTION, SOLUTION SUBCUTANEOUS
Qty: 15 ML | Refills: 0 | Status: SHIPPED | OUTPATIENT
Start: 2020-05-07 | End: 2020-05-08 | Stop reason: SDUPTHER

## 2020-05-07 NOTE — TELEPHONE ENCOUNTER
Last office visit 2/11/2020  Last med refill  Basaglar, 2/26/2020  Metformin has been filled on 5/4/2020 confirmed receipt from pharmacy

## 2020-05-07 NOTE — TELEPHONE ENCOUNTER
----- Message from Miranda Miner sent at 5/6/2020  1:39 PM EDT -----  Regarding: /Refill  Medication Refill    Caller (if not patient):      Relationship of caller (if not patient):      Best contact number(s):  [phone]    Name of medication and dosage if known:  \"Basaglar\" and \"Metforman\"    Is patient out of this medication (yes/no):   No     Pharmacy name:[facility]    Pharmacy listed in chart? (yes/no): Yes  Pharmacy phone number: [phone]      Details to clarify the request:      Miranda Miner

## 2020-05-08 DIAGNOSIS — E11.9 CONTROLLED TYPE 2 DIABETES MELLITUS WITHOUT COMPLICATION, WITHOUT LONG-TERM CURRENT USE OF INSULIN (HCC): ICD-10-CM

## 2020-05-08 RX ORDER — INSULIN GLARGINE 100 [IU]/ML
INJECTION, SOLUTION SUBCUTANEOUS
Qty: 15 ML | Refills: 0 | Status: SHIPPED | OUTPATIENT
Start: 2020-05-08 | End: 2020-05-11

## 2020-05-08 NOTE — TELEPHONE ENCOUNTER
Note from Pharmacy: alternative requested. Please submit prior auth change to insurance preferred med.     Suggested alternatives: basaglar 100 unit/ml kwikpen, levemir flextouch 100 unit/ml, levemir 100 unit/ml vial    [facility] [address]

## 2020-05-13 ENCOUNTER — VIRTUAL VISIT (OUTPATIENT)
Dept: PRIMARY CARE CLINIC | Age: 50
End: 2020-05-13

## 2020-05-13 DIAGNOSIS — F51.01 PRIMARY INSOMNIA: ICD-10-CM

## 2020-05-13 DIAGNOSIS — G62.0 CHEMOTHERAPY-INDUCED NEUROPATHY (HCC): ICD-10-CM

## 2020-05-13 DIAGNOSIS — E11.9 CONTROLLED TYPE 2 DIABETES MELLITUS WITHOUT COMPLICATION, WITH LONG-TERM CURRENT USE OF INSULIN (HCC): Primary | ICD-10-CM

## 2020-05-13 DIAGNOSIS — T45.1X5A CHEMOTHERAPY-INDUCED NEUROPATHY (HCC): ICD-10-CM

## 2020-05-13 DIAGNOSIS — C57.02 CARCINOMA OF LEFT FALLOPIAN TUBE (HCC): ICD-10-CM

## 2020-05-13 DIAGNOSIS — Z79.4 CONTROLLED TYPE 2 DIABETES MELLITUS WITHOUT COMPLICATION, WITH LONG-TERM CURRENT USE OF INSULIN (HCC): Primary | ICD-10-CM

## 2020-05-13 RX ORDER — INSULIN GLARGINE 100 [IU]/ML
INJECTION, SOLUTION SUBCUTANEOUS
Qty: 15 ML | Refills: 0 | Status: SHIPPED | OUTPATIENT
Start: 2020-05-13 | End: 2020-06-06

## 2020-05-13 NOTE — PROGRESS NOTES
Written by Jenna Ponce, as dictated by Dr. Jennie Vega MD.    Livia Flores is a 52 y.o. female. HPI  I was in the office while conducting this encounter. Consent:  She and/or her healthcare decision maker is aware that this patient-initiated Telehealth encounter is a billable service, with coverage as determined by her insurance carrier. She is aware that she may receive a bill and has provided verbal consent to proceed: Yes    This virtual visit was conducted via [employer]. Pursuant to the emergency declaration under the [facility], 1135 waiver authority and the [employer] Act, this Virtual  Visit was conducted to reduce the patient's risk of exposure to COVID-19 and provide continuity of care for an established patient. Services were provided through a video synchronous discussion virtually to substitute for in-person clinic visit. Due to this being a TeleHealth evaluation, many elements of the physical examination are unable to be assessed. The patient presents today for a follow-up. She is currently undergoing chemotherapy, she finished her fourth round of chemo yesterday 5/12/2020. She has two rounds of chemotherapy left. She had a CT scan done after her third round of chemo and it showed she is responding well to the treatment. She is concerned because her BS has been elevated when gets chemotherapy. This morning her BS was 77 but is the lowest it has been awhile. She has been taking Prednisone during her treatment. She had a UA on Monday and her glucose was 3+ and that worried her. She is compliant on Metformin 850 mg. She takes 12 units and 20 units at night of Basaglar Lantus at night. She has been drinking a lot of water    She has been experiencing some neuropathy and is compliant on Gabapentin 600 mg.      She has had some insomnia and she sometimes will take Melatonin. Patient Active Problem List   Diagnosis Code    AR (allergic rhinitis) J30.9    Asthma J45.909    Hypercholesterolemia E78.00    S/P laparotomy Z98.890    Carcinomatosis ([facility] [address] C80.0    Fallopian tube cancer, carcinoma ([facility] [address] C57.00        Current Outpatient Medications on File Prior to Visit   Medication Sig Dispense Refill    insulin glargine (Basaglar KwikPen U-100 Insulin) 100 unit/mL (3 mL) inpn INJECT 20 UNITS SUBCUTANEOUSLY ONCE DAILY 15 mL 1    metFORMIN (GLUCOPHAGE) 850 mg tablet TAKE 1 TABLET BY MOUTH TWICE A DAY WITH MEALS 180 Tab 0    Insulin Needles, Disposable, (BD DAFNE 2ND GEN PEN NEEDLE) 32 gauge x 5/32\" ndle USE 1 NEEDLE DAILY TO ADMINISTER 8 UNITS OF LANTUS SUBCUTANEOUSLY 100 Pen Needle 2    nortriptyline (PAMELOR) 25 mg capsule Take 25 mg by mouth nightly.  gabapentin (NEURONTIN) 600 mg tablet Take 600 mg by mouth daily.  magnesium oxide 250 mg magnesium tablet Take 250 mg by mouth daily.  melatonin 5 mg tablet Take 5 mg by mouth nightly.  ondansetron (ZOFRAN ODT) 8 mg disintegrating tablet       Blood-Glucose Meter monitoring kit Check blood sugar 3 times a day. 1 Kit 0    glucose blood VI test strips (ACCU-CHEK ACTIVE TEST) strip Use QID and  Strip 5    gabapentin (NEURONTIN) 300 mg capsule Take 1 Cap by mouth three (3) times daily. (Patient not taking: Reported on 2/11/2020) 270 Cap 0    liposomal DOXOrubicin, DOXIL/LIPODOX, (DOXIL) 2 mg/mL injection by IntraVENous route once.  cetirizine (ZYRTEC) 10 mg tablet Take  by mouth.  glucose blood VI test strips (ASCENSIA AUTODISC VI, ONE TOUCH ULTRA TEST VI) strip Glucose one touch test strips 100 Strip 11    b complex vitamins (B COMPLEX 1) tablet Take 1 Tab by mouth daily.  glucose blood VI test strips (ACCU-CHEK REYNA PLUS TEST STRP) strip Use four times a day and PRN. 100 Strip 5    Lancets misc Use as directed.  1 Each 11    CRANIAL PROSTHESIS misc Cranial Prosthesis  Diagnosis code  Cpt code 1 Each 2    lidocaine-prilocaine (EMLA) topical cream Apply small amount over port area and cover with band aid 1 hour before chemo 30 g 2     No current facility-administered medications on file prior to visit. Allergies   Allergen Reactions    Diphenhydramine-Acetaminophen Rash    Tylenol [Acetaminophen] Swelling     Tylenol pm only  SLURRED SPEECH. Past Medical History:   Diagnosis Date    AR (allergic rhinitis) 2010    Asthma 2010    ALLERGY RELATED; NONE USED SINCE     Cancer [facility])     OVARIAN AND FALLOPIAN TUBE    Cancer ([facility] [address] 2019    ovarian cancer matasticized to bladder.  DM mellitus, gestational 2010    PRE DIABETIC    Elevated TSH 2010    Family history of diabetes mellitus (DM)     History of ovarian cancer 2016    Hypercholesterolemia 2011    elevated particle number    Ill-defined condition 16    HYDRONEPHROSIS, LEFT RENAL MASS (BEING WORKED UP BY DR MC Tucker.)    Impaired glucose tolerance 2011    Menopause 2016    Other activity     BCG vaccine as a child    S/P laparotomy 2016    Uterine fibroid 2010    ENDOMETRIOSIS    Vitamin D deficiency 2010       Past Surgical History:   Procedure Laterality Date    ABDOMEN SURGERY PROC UNLISTED  2016    EXP LAP FOR BOWEL OBST.     HX APPENDECTOMY      HX  SECTION  ,     HX GYN  16    hysterectomy, TUBES REMOVED  ([facility])    HX HYSTERECTOMY  2016    HX OOPHORECTOMY Bilateral 2016    HX OTHER SURGICAL      small bowel obstruction, ILEUS POSTOP    HX VASCULAR ACCESS      PORT A CATH L CHEST WALL    HX WISDOM TEETH EXTRACTION      age 28       Family History   Problem Relation Age of Onset    Diabetes Mother     Diabetes Father     Hypertension Father     Diabetes Maternal Grandmother     Diabetes Maternal Grandfather     No Known Problems Sister     Diabetes Paternal Uncle     Anesth Problems Neg Hx        Social History     Socioeconomic History    Marital status:      Spouse name: Not on file    Number of children: Not on file    Years of education: Not on file    Highest education level: Not on file   Occupational History    Not on file   Social Needs    Financial resource strain: Not on file    Food insecurity     Worry: Not on file     Inability: Not on file    Transportation needs     Medical: Not on file     Non-medical: Not on file   Tobacco Use    Smoking status: Never Smoker    Smokeless tobacco: Never Used   Substance and Sexual Activity    Alcohol use: No    Drug use: No    Sexual activity: Yes     Partners: Male   Lifestyle    Physical activity     Days per week: Not on file     Minutes per session: Not on file    Stress: Not on file   Relationships    Social connections     Talks on phone: Not on file     Gets together: Not on file     Attends [employer]ism service: Not on file     Active member of club or organization: Not on file     Attends meetings of clubs or organizations: Not on file     Relationship status: Not on file    Intimate partner violence     Fear of current or ex partner: Not on file     Emotionally abused: Not on file     Physically abused: Not on file     Forced sexual activity: Not on file   Other Topics Concern    Not on file   Social History Narrative    Not on file       No visits with results within 3 Month(s) from this visit. Latest known visit with results is:   Office Visit on 02/11/2020   Component Date Value Ref Range Status    Hemoglobin A1c 02/11/2020 8.3* 4.8 - 5.6 % Final    Comment:          Prediabetes: 5.7 - 6.4           Diabetes: >6.4           Glycemic control for adults with diabetes: <7.0      Estimated average glucose 02/11/2020 192  mg/dL Final     Review of Systems   Constitutional: Negative for malaise/fatigue. HENT: Negative for congestion. Eyes: Negative for blurred vision. Respiratory: Negative for shortness of breath. Cardiovascular: Negative for chest pain and leg swelling. Gastrointestinal: Negative for constipation, diarrhea and heartburn. Genitourinary: Negative for dysuria, frequency and urgency. Musculoskeletal: Negative for joint pain and myalgias. Neurological: Negative for dizziness and headaches. Psychiatric/Behavioral: Negative for depression and substance abuse. The patient has insomnia. The patient is not nervous/anxious. Physical Exam  Constitutional:       General: She is not in acute distress. Appearance: She is well-developed. She is not diaphoretic. HENT:      Head: Normocephalic and atraumatic. Nose: No congestion. Eyes:      General:         Right eye: No discharge. Left eye: No discharge. Conjunctiva/sclera: Conjunctivae normal.   Pulmonary:      Effort: Pulmonary effort is normal. No respiratory distress. Neurological:      Mental Status: She is alert and oriented to person, place, and time. Psychiatric:         Behavior: Behavior normal.         Thought Content: Thought content normal.         ASSESSMENT and PLAN    ICD-10-CM ICD-9-CM    1. Controlled type 2 diabetes mellitus without complication, with long-term current use of insulin ([facility]) E11.9 250.00 insulin glargine (LANTUS,BASAGLAR) 100 unit/mL (3 mL) inpn sent to pharmacy     Lantus 100 unit/mL refilled     Explained that her BS is elevated because she is taking Prednisone during her chemotherapy treatment. 20 units of lantus 20 units of lantus at nights of chemo   Explained that steroids make blood sugar elevate   Advised pt to call pharmacy and tell them she was ordered Lantus       Z79.4 V58.67 Compliant on Metformin 850 mg    2. Carcinoma of left fallopian tube ([facility]) C57.02 183.2 Followed by oncology    3.  Chemotherapy-induced neuropathy ([facility]) G62.0 357.6 Pt is undergoing chemotherapy and is responding to her treatment     T45.1X5A E933.1 Explained that Prednisone is making her blood sugar elevate    4. Primary insomnia F51.01 307.42 Explained that she can continue to take melatonin        Total Time: minutes: 11-20 minutes. This plan was reviewed with the patient and patient agrees. All questions were answered. This scribe documentation was reviewed by me and accurately reflects the examination and decisions made by me. This note will not be viewable in [address]

## 2020-06-16 DIAGNOSIS — E11.9 CONTROLLED TYPE 2 DIABETES MELLITUS WITHOUT COMPLICATION, WITHOUT LONG-TERM CURRENT USE OF INSULIN (HCC): ICD-10-CM

## 2020-06-16 RX ORDER — INSULIN GLARGINE 100 [IU]/ML
INJECTION, SOLUTION SUBCUTANEOUS
Qty: 45 ML | Refills: 1 | Status: SHIPPED | OUTPATIENT
Start: 2020-06-16 | End: 2020-08-21 | Stop reason: SDUPTHER

## 2020-06-16 NOTE — TELEPHONE ENCOUNTER
----- Message from Ihsan Rome sent at 6/16/2020 11:14 AM EDT -----  Regarding: Dr. Vipin Holden  Needs refill for insulin glargine (Basaglar KwikPen U-100 Insulin) 90 day supply to be sent to [facility] on  [address]  . Best contact number is [phone].

## 2020-07-09 DIAGNOSIS — E11.9 CONTROLLED TYPE 2 DIABETES MELLITUS WITHOUT COMPLICATION, WITHOUT LONG-TERM CURRENT USE OF INSULIN (HCC): Primary | ICD-10-CM

## 2020-07-09 DIAGNOSIS — Z79.4 CONTROLLED TYPE 2 DIABETES MELLITUS WITHOUT COMPLICATION, WITH LONG-TERM CURRENT USE OF INSULIN (HCC): ICD-10-CM

## 2020-07-09 DIAGNOSIS — E11.9 CONTROLLED TYPE 2 DIABETES MELLITUS WITHOUT COMPLICATION, WITH LONG-TERM CURRENT USE OF INSULIN (HCC): ICD-10-CM

## 2020-07-09 RX ORDER — PEN NEEDLE, DIABETIC 31 GX3/16"
NEEDLE, DISPOSABLE MISCELLANEOUS
Qty: 300 PEN NEEDLE | Refills: 0 | Status: SHIPPED | OUTPATIENT
Start: 2020-07-09 | End: 2020-08-24 | Stop reason: SDUPTHER

## 2020-07-09 RX ORDER — BLOOD SUGAR DIAGNOSTIC
STRIP MISCELLANEOUS
Qty: 300 STRIP | Refills: 2 | Status: SHIPPED | OUTPATIENT
Start: 2020-07-09

## 2020-08-06 DIAGNOSIS — E11.9 CONTROLLED TYPE 2 DIABETES MELLITUS WITHOUT COMPLICATION, WITHOUT LONG-TERM CURRENT USE OF INSULIN (HCC): ICD-10-CM

## 2020-08-06 RX ORDER — METFORMIN HYDROCHLORIDE 850 MG/1
TABLET ORAL
Qty: 180 TAB | Refills: 0 | Status: SHIPPED | OUTPATIENT
Start: 2020-08-06 | End: 2020-12-15 | Stop reason: SDUPTHER

## 2020-08-21 DIAGNOSIS — E11.9 CONTROLLED TYPE 2 DIABETES MELLITUS WITHOUT COMPLICATION, WITHOUT LONG-TERM CURRENT USE OF INSULIN (HCC): ICD-10-CM

## 2020-08-21 RX ORDER — INSULIN GLARGINE 100 [IU]/ML
INJECTION, SOLUTION SUBCUTANEOUS
Qty: 45 ML | Refills: 1 | Status: SHIPPED | OUTPATIENT
Start: 2020-08-21 | End: 2021-12-07 | Stop reason: ALTCHOICE

## 2020-08-24 DIAGNOSIS — E11.9 CONTROLLED TYPE 2 DIABETES MELLITUS WITHOUT COMPLICATION, WITH LONG-TERM CURRENT USE OF INSULIN (HCC): ICD-10-CM

## 2020-08-24 DIAGNOSIS — Z79.4 CONTROLLED TYPE 2 DIABETES MELLITUS WITHOUT COMPLICATION, WITH LONG-TERM CURRENT USE OF INSULIN (HCC): ICD-10-CM

## 2020-08-24 DIAGNOSIS — E11.9 CONTROLLED TYPE 2 DIABETES MELLITUS WITHOUT COMPLICATION, WITHOUT LONG-TERM CURRENT USE OF INSULIN (HCC): ICD-10-CM

## 2020-08-24 RX ORDER — PEN NEEDLE, DIABETIC 31 GX3/16"
NEEDLE, DISPOSABLE MISCELLANEOUS
Qty: 300 PEN NEEDLE | Refills: 6 | Status: SHIPPED | OUTPATIENT
Start: 2020-08-24 | End: 2020-09-02 | Stop reason: SDUPTHER

## 2020-08-24 RX ORDER — INSULIN GLARGINE 100 [IU]/ML
INJECTION, SOLUTION SUBCUTANEOUS
Qty: 15 ML | Refills: 0 | Status: SHIPPED | OUTPATIENT
Start: 2020-08-24 | End: 2020-08-26 | Stop reason: SDUPTHER

## 2020-08-24 NOTE — TELEPHONE ENCOUNTER
Confirmed patient id.   Patient states that she is taking 14 units in the morning and 16 units in the evening for a total of 30 units daily please resend the script

## 2020-08-24 NOTE — TELEPHONE ENCOUNTER
Patient is calling confirming the units Rx for 20 units. She states she takes 14 units in the AM and 16 units in the PM. Please call patient for clarification.

## 2020-08-24 NOTE — TELEPHONE ENCOUNTER
Last office visit 5/13/2020  Last med refill  7/9/2020 [facility] DIVISION OF KIDNEY DISEASES AND HYPERTENSION -- INITIAL CONSULT NOTE  --------------------------------------------------------------------------------  HPI: 29 year old female with a PMHx of HTN, SLE,LN ESRD on HD TTS admitted for lupus flare. Pt. gets HD at [facility]. Pt. last had HD on 2/6, tolerated well without complications and AVF was working well. Currently pt. denies SOB, CP , N/V or edema.       PAST HISTORY  --------------------------------------------------------------------------------  PAST MEDICAL & SURGICAL HISTORY:  ESRD (end stage renal disease) on dialysis: 2/2 microangiopathy  Microangiopathy  Lupus nephritis  Essential Hypertension, Benign  Systemic Lupus Erythematosus  A-V fistula  Hemorrhage following kidney biopsy    FAMILY HISTORY:  Family history of hypertension (Aunt)    PAST SOCIAL HISTORY: No history of alcohol, drugs or tobacco     ALLERGIES & MEDICATIONS  --------------------------------------------------------------------------------  Allergies    No Known Allergies    Intolerances      Standing Inpatient Medications  cinacalcet 30 milliGRAM(s) Oral daily  docusate sodium 100 milliGRAM(s) Oral two times a day  famotidine    Tablet 20 milliGRAM(s) Oral daily  folic acid 1 milliGRAM(s) Oral daily  gabapentin 100 milliGRAM(s) Oral <User Schedule>  gabapentin 100 milliGRAM(s) Oral <User Schedule>  heparin  Injectable 5000 Unit(s) SubCutaneous every 8 hours  hydrALAZINE 100 milliGRAM(s) Oral two times a day  labetalol 300 milliGRAM(s) Oral two times a day  NIFEdipine XL 90 milliGRAM(s) Oral daily  pantoprazole    Tablet 40 milliGRAM(s) Oral before breakfast  polyethylene glycol 3350 17 Gram(s) Oral daily  senna 2 Tablet(s) Oral at bedtime    PRN Inpatient Medications  acetaminophen   Tablet. 650 milliGRAM(s) Oral every 6 hours PRN  HYDROmorphone   Tablet 4 milliGRAM(s) Oral every 4 hours PRN      REVIEW OF SYSTEMS  --------------------------------------------------------------------------------  Gen: +fevers  Head/Eyes/Ears/Mouth: No headache  Respiratory: No dyspnea  CV: No chest pain  GI: No abdominal pain  : No  dysuria  MSK: No edema  Skin: +rash         All other systems were reviewed and are negative, except as noted.    VITALS/PHYSICAL EXAM  --------------------------------------------------------------------------------  T(C): 36.8 (02-09-18 @ 10:02), Max: 37.2 (02-08-18 @ 18:48)  HR: 80 (02-09-18 @ 10:47) (75 - 86)  BP: 152/95 (02-09-18 @ 10:02) (136/96 - 220/110)  RR: 18 (02-09-18 @ 10:02) (16 - 18)  SpO2: 98% (02-09-18 @ 10:02) (97% - 100%)  Wt(kg): --  Height (cm): 167.64 (02-08-18 @ 20:45)  Weight (kg): 62.5 (02-08-18 @ 20:45)  BMI (kg/m2): 22.2 (02-08-18 @ 20:45)  BSA (m2): 1.71 (02-08-18 @ 20:45)      Physical Exam:  	Gen: NAD,               HEENT:  supple neck  	Pulm: CTA B/L  	CV: RRR,   	Abd:  soft,  	LE: Warm, no edema  	Psych: Normal affect and mood  	Skin: Warm, +rash arms/legs  	Vascular access: RUE AVF+ thrill/bruit     LABS/STUDIES  --------------------------------------------------------------------------------              9.8    2.83  >-----------<  164      [02-09-18 @ 06:20]              30.3     139  |  95  |  57  ----------------------------<  85      [02-09-18 @ 06:20]  5.5   |  25  |  10.26        Ca     8.6     [02-09-18 @ 06:20]      Mg     2.3     [02-09-18 @ 06:20]      Phos  6.9     [02-09-18 @ 06:20]    TPro  6.5  /  Alb  3.7  /  TBili  0.3  /  DBili  x   /  AST  19  /  ALT  9   /  AlkPhos  77  [02-08-18 @ 14:23]          Creatinine Trend:  SCr 10.26 [02-09 @ 06:20]  SCr 9.61 [02-08 @ 14:23]    Urinalysis - [02-08-18 @ 17:29]      Color PLYEL / Appearance CLEAR / SG 1.011 / pH 8.5      Gluc NEGATIVE / Ketone NEGATIVE  / Bili NEGATIVE / Urobili NORMAL       Blood NEGATIVE / Protein 300 / Leuk Est NEGATIVE / Nitrite NEGATIVE      RBC 0-2 / WBC 5-10 / Hyaline  / Gran  / Sq Epi MOD / Non Sq Epi  / Bacteria [facility] DIVISION OF KIDNEY DISEASES AND HYPERTENSION -- INITIAL CONSULT NOTE  --------------------------------------------------------------------------------  HPI: 29 year old female with a PMHx of HTN, SLE,LN ESRD on HD TTS admitted for lupus flare. Pt. gets HD at [facility]. Pt. last had HD on 2/6, tolerated well without complications and AVF was working well. Currently pt. denies SOB, CP , N/V or edema.       PAST HISTORY  --------------------------------------------------------------------------------  PAST MEDICAL & SURGICAL HISTORY:  ESRD (end stage renal disease) on dialysis: 2/2 microangiopathy  Microangiopathy  Lupus nephritis  Essential Hypertension, Benign  Systemic Lupus Erythematosus  A-V fistula  Hemorrhage following kidney biopsy    FAMILY HISTORY:  Family history of hypertension (Aunt)    PAST SOCIAL HISTORY: No history of alcohol, drugs or tobacco     ALLERGIES & MEDICATIONS  --------------------------------------------------------------------------------  Allergies    No Known Allergies    Intolerances      Standing Inpatient Medications  cinacalcet 30 milliGRAM(s) Oral daily  docusate sodium 100 milliGRAM(s) Oral two times a day  famotidine    Tablet 20 milliGRAM(s) Oral daily  folic acid 1 milliGRAM(s) Oral daily  gabapentin 100 milliGRAM(s) Oral <User Schedule>  gabapentin 100 milliGRAM(s) Oral <User Schedule>  heparin  Injectable 5000 Unit(s) SubCutaneous every 8 hours  hydrALAZINE 100 milliGRAM(s) Oral two times a day  labetalol 300 milliGRAM(s) Oral two times a day  NIFEdipine XL 90 milliGRAM(s) Oral daily  pantoprazole    Tablet 40 milliGRAM(s) Oral before breakfast  polyethylene glycol 3350 17 Gram(s) Oral daily  senna 2 Tablet(s) Oral at bedtime    PRN Inpatient Medications  acetaminophen   Tablet. 650 milliGRAM(s) Oral every 6 hours PRN  HYDROmorphone   Tablet 4 milliGRAM(s) Oral every 4 hours PRN      REVIEW OF SYSTEMS  --------------------------------------------------------------------------------  Gen: +fevers  Head/Eyes/Ears/Mouth: No headache  Respiratory: No dyspnea  CV: No chest pain  GI: No abdominal pain  : No  dysuria  MSK: No edema  Skin: +rash         All other systems were reviewed and are negative, except as noted.    VITALS/PHYSICAL EXAM  --------------------------------------------------------------------------------  T(C): 36.8 (02-09-18 @ 10:02), Max: 37.2 (02-08-18 @ 18:48)  HR: 80 (02-09-18 @ 10:47) (75 - 86)  BP: 152/95 (02-09-18 @ 10:02) (136/96 - 220/110)  RR: 18 (02-09-18 @ 10:02) (16 - 18)  SpO2: 98% (02-09-18 @ 10:02) (97% - 100%)  Wt(kg): --  Height (cm): 167.64 (02-08-18 @ 20:45)  Weight (kg): 62.5 (02-08-18 @ 20:45)  BMI (kg/m2): 22.2 (02-08-18 @ 20:45)  BSA (m2): 1.71 (02-08-18 @ 20:45)      Physical Exam:  	Gen: NAD,               HEENT:  supple neck  	Pulm: CTA B/L.  	CV: RRR,   	Abd:  soft,  	LE: Warm, no edema  	Psych: Normal affect and mood  	Skin: Warm, +rash arms/legs  	Vascular access: RUE AVF+ thrill/bruit     LABS/STUDIES  --------------------------------------------------------------------------------              9.8    2.83  >-----------<  164      [02-09-18 @ 06:20]              30.3     139  |  95  |  57  ----------------------------<  85      [02-09-18 @ 06:20]  5.5   |  25  |  10.26        Ca     8.6     [02-09-18 @ 06:20]      Mg     2.3     [02-09-18 @ 06:20]      Phos  6.9     [02-09-18 @ 06:20]    TPro  6.5  /  Alb  3.7  /  TBili  0.3  /  DBili  x   /  AST  19  /  ALT  9   /  AlkPhos  77  [02-08-18 @ 14:23]          Creatinine Trend:  SCr 10.26 [02-09 @ 06:20]  SCr 9.61 [02-08 @ 14:23]    Urinalysis - [02-08-18 @ 17:29]      Color PLYEL / Appearance CLEAR / SG 1.011 / pH 8.5      Gluc NEGATIVE / Ketone NEGATIVE  / Bili NEGATIVE / Urobili NORMAL       Blood NEGATIVE / Protein 300 / Leuk Est NEGATIVE / Nitrite NEGATIVE      RBC 0-2 / WBC 5-10 / Hyaline  / Gran  / Sq Epi MOD / Non Sq Epi  / Bacteria

## 2020-08-26 DIAGNOSIS — Z79.4 CONTROLLED TYPE 2 DIABETES MELLITUS WITHOUT COMPLICATION, WITH LONG-TERM CURRENT USE OF INSULIN (HCC): ICD-10-CM

## 2020-08-26 DIAGNOSIS — E11.9 CONTROLLED TYPE 2 DIABETES MELLITUS WITHOUT COMPLICATION, WITH LONG-TERM CURRENT USE OF INSULIN (HCC): ICD-10-CM

## 2020-08-26 DIAGNOSIS — E11.9 CONTROLLED TYPE 2 DIABETES MELLITUS WITHOUT COMPLICATION, WITHOUT LONG-TERM CURRENT USE OF INSULIN (HCC): ICD-10-CM

## 2020-08-26 RX ORDER — INSULIN GLARGINE 100 [IU]/ML
INJECTION, SOLUTION SUBCUTANEOUS
Qty: 45 ML | Refills: 1 | Status: CANCELLED | OUTPATIENT
Start: 2020-08-26

## 2020-08-26 RX ORDER — INSULIN GLARGINE 100 [IU]/ML
INJECTION, SOLUTION SUBCUTANEOUS
Qty: 45 ML | Refills: 0 | Status: SHIPPED | OUTPATIENT
Start: 2020-08-26 | End: 2020-12-15 | Stop reason: SDUPTHER

## 2020-09-02 DIAGNOSIS — E11.9 CONTROLLED TYPE 2 DIABETES MELLITUS WITHOUT COMPLICATION, WITHOUT LONG-TERM CURRENT USE OF INSULIN (HCC): ICD-10-CM

## 2020-09-02 RX ORDER — PEN NEEDLE, DIABETIC 31 GX3/16"
NEEDLE, DISPOSABLE MISCELLANEOUS
Qty: 300 PEN NEEDLE | Refills: 6 | Status: SHIPPED | OUTPATIENT
Start: 2020-09-02 | End: 2020-09-03 | Stop reason: SDUPTHER

## 2020-09-02 NOTE — TELEPHONE ENCOUNTER
Pt. Called for medication refill of Inulin [place] ([facility]). [facility] said [facility] on Thursday 8/27 and Friday 8/28. Pt. stated that [facility] said that pt. Needs a 90 day supply. Pt. Stated she is out of needles. Pt. Paris Pineda said they can do a 3 month 90 day supply , since the pt. Takes her medication twice a day.  [facility] (phone number: [phone]) Fax Number: [phone]

## 2020-09-03 DIAGNOSIS — E11.9 CONTROLLED TYPE 2 DIABETES MELLITUS WITHOUT COMPLICATION, WITHOUT LONG-TERM CURRENT USE OF INSULIN (HCC): ICD-10-CM

## 2020-09-03 RX ORDER — PEN NEEDLE, DIABETIC 31 GX3/16"
NEEDLE, DISPOSABLE MISCELLANEOUS
Qty: 300 PEN NEEDLE | Refills: 6 | Status: SHIPPED | OUTPATIENT
Start: 2020-09-03 | End: 2020-09-18 | Stop reason: SDUPTHER

## 2020-09-03 NOTE — TELEPHONE ENCOUNTER
Needed to say 2 Needle daily to administer 14 units in the morning 16 units in the evening. Please review and send 90 days supply as requested by pt. Pharmacy is rejecting the earlier request. This happened twice before, she requested to fix this soon. Pt ran out of the script so needs it urgently.

## 2020-09-18 DIAGNOSIS — E11.9 CONTROLLED TYPE 2 DIABETES MELLITUS WITHOUT COMPLICATION, WITHOUT LONG-TERM CURRENT USE OF INSULIN (HCC): ICD-10-CM

## 2020-09-21 RX ORDER — PEN NEEDLE, DIABETIC 31 GX3/16"
NEEDLE, DISPOSABLE MISCELLANEOUS
Qty: 300 PEN NEEDLE | Refills: 6 | Status: SHIPPED | OUTPATIENT
Start: 2020-09-21 | End: 2020-12-15 | Stop reason: SDUPTHER

## 2020-12-10 ENCOUNTER — TELEPHONE (OUTPATIENT)
Dept: PRIMARY CARE CLINIC | Age: 50
End: 2020-12-10

## 2020-12-10 DIAGNOSIS — G62.0 CHEMOTHERAPY-INDUCED NEUROPATHY (HCC): ICD-10-CM

## 2020-12-10 DIAGNOSIS — I10 ESSENTIAL HYPERTENSION: ICD-10-CM

## 2020-12-10 DIAGNOSIS — E55.9 VITAMIN D DEFICIENCY: ICD-10-CM

## 2020-12-10 DIAGNOSIS — E78.2 MIXED HYPERLIPIDEMIA: ICD-10-CM

## 2020-12-10 DIAGNOSIS — E03.9 ACQUIRED HYPOTHYROIDISM: ICD-10-CM

## 2020-12-10 DIAGNOSIS — T45.1X5A CHEMOTHERAPY-INDUCED NEUROPATHY (HCC): ICD-10-CM

## 2020-12-10 DIAGNOSIS — Z00.00 PHYSICAL EXAM: ICD-10-CM

## 2020-12-10 DIAGNOSIS — E11.9 CONTROLLED TYPE 2 DIABETES MELLITUS WITHOUT COMPLICATION, WITHOUT LONG-TERM CURRENT USE OF INSULIN (HCC): Primary | ICD-10-CM

## 2020-12-11 DIAGNOSIS — T45.1X5A CHEMOTHERAPY-INDUCED NEUROPATHY (HCC): ICD-10-CM

## 2020-12-11 DIAGNOSIS — Z00.00 PHYSICAL EXAM: ICD-10-CM

## 2020-12-11 DIAGNOSIS — G62.0 CHEMOTHERAPY-INDUCED NEUROPATHY (HCC): ICD-10-CM

## 2020-12-11 DIAGNOSIS — E55.9 VITAMIN D DEFICIENCY: ICD-10-CM

## 2020-12-11 DIAGNOSIS — E11.9 CONTROLLED TYPE 2 DIABETES MELLITUS WITHOUT COMPLICATION, WITHOUT LONG-TERM CURRENT USE OF INSULIN (HCC): ICD-10-CM

## 2020-12-11 DIAGNOSIS — E78.2 MIXED HYPERLIPIDEMIA: ICD-10-CM

## 2020-12-11 DIAGNOSIS — E03.9 ACQUIRED HYPOTHYROIDISM: ICD-10-CM

## 2020-12-11 DIAGNOSIS — I10 ESSENTIAL HYPERTENSION: ICD-10-CM

## 2020-12-12 LAB
25(OH)D3 SERPL-MCNC: 24.6 NG/ML (ref 30–100)
ALBUMIN SERPL-MCNC: 3.9 G/DL (ref 3.5–5)
ALBUMIN/GLOB SERPL: 1.1 {RATIO} (ref 1.1–2.2)
ALP SERPL-CCNC: 82 U/L (ref 45–117)
ALT SERPL-CCNC: 51 U/L (ref 12–78)
ANION GAP SERPL CALC-SCNC: 5 MMOL/L (ref 5–15)
AST SERPL-CCNC: 26 U/L (ref 15–37)
BASOPHILS # BLD: 0 K/UL (ref 0–0.1)
BASOPHILS NFR BLD: 0 % (ref 0–1)
BILIRUB SERPL-MCNC: 0.4 MG/DL (ref 0.2–1)
BUN SERPL-MCNC: 10 MG/DL (ref 6–20)
BUN/CREAT SERPL: 16 (ref 12–20)
CALCIUM SERPL-MCNC: 8.9 MG/DL (ref 8.5–10.1)
CHLORIDE SERPL-SCNC: 106 MMOL/L (ref 97–108)
CHOLEST SERPL-MCNC: 134 MG/DL
CO2 SERPL-SCNC: 27 MMOL/L (ref 21–32)
CREAT SERPL-MCNC: 0.64 MG/DL (ref 0.55–1.02)
CREAT UR-MCNC: 189 MG/DL
DIFFERENTIAL METHOD BLD: ABNORMAL
EOSINOPHIL # BLD: 0.3 K/UL (ref 0–0.4)
EOSINOPHIL NFR BLD: 5 % (ref 0–7)
ERYTHROCYTE [DISTWIDTH] IN BLOOD BY AUTOMATED COUNT: 18.6 % (ref 11.5–14.5)
EST. AVERAGE GLUCOSE BLD GHB EST-MCNC: 123 MG/DL
GLOBULIN SER CALC-MCNC: 3.7 G/DL (ref 2–4)
GLUCOSE SERPL-MCNC: 125 MG/DL (ref 65–100)
HBA1C MFR BLD: 5.9 % (ref 4–5.6)
HCT VFR BLD AUTO: 28.8 % (ref 35–47)
HDLC SERPL-MCNC: 29 MG/DL
HDLC SERPL: 4.6 {RATIO} (ref 0–5)
HGB BLD-MCNC: 9.4 G/DL (ref 11.5–16)
IMM GRANULOCYTES # BLD AUTO: 0 K/UL (ref 0–0.04)
IMM GRANULOCYTES NFR BLD AUTO: 0 % (ref 0–0.5)
LDLC SERPL CALC-MCNC: 31.2 MG/DL (ref 0–100)
LIPID PROFILE,FLP: ABNORMAL
LYMPHOCYTES # BLD: 2.5 K/UL (ref 0.8–3.5)
LYMPHOCYTES NFR BLD: 40 % (ref 12–49)
MCH RBC QN AUTO: 33.1 PG (ref 26–34)
MCHC RBC AUTO-ENTMCNC: 32.6 G/DL (ref 30–36.5)
MCV RBC AUTO: 101.4 FL (ref 80–99)
MICROALBUMIN UR-MCNC: 1.82 MG/DL
MICROALBUMIN/CREAT UR-RTO: 10 MG/G (ref 0–30)
MONOCYTES # BLD: 0.5 K/UL (ref 0–1)
MONOCYTES NFR BLD: 9 % (ref 5–13)
NEUTS SEG # BLD: 2.9 K/UL (ref 1.8–8)
NEUTS SEG NFR BLD: 46 % (ref 32–75)
NRBC # BLD: 0 K/UL (ref 0–0.01)
NRBC BLD-RTO: 0 PER 100 WBC
PLATELET # BLD AUTO: 138 K/UL (ref 150–400)
PMV BLD AUTO: 10.3 FL (ref 8.9–12.9)
POTASSIUM SERPL-SCNC: 3.9 MMOL/L (ref 3.5–5.1)
PROT SERPL-MCNC: 7.6 G/DL (ref 6.4–8.2)
RBC # BLD AUTO: 2.84 M/UL (ref 3.8–5.2)
SODIUM SERPL-SCNC: 138 MMOL/L (ref 136–145)
TRIGL SERPL-MCNC: 369 MG/DL (ref ?–150)
TSH SERPL DL<=0.05 MIU/L-ACNC: 1.81 UIU/ML (ref 0.36–3.74)
VLDLC SERPL CALC-MCNC: 73.8 MG/DL
WBC # BLD AUTO: 6.3 K/UL (ref 3.6–11)

## 2020-12-14 DIAGNOSIS — E11.9 CONTROLLED TYPE 2 DIABETES MELLITUS WITHOUT COMPLICATION, WITHOUT LONG-TERM CURRENT USE OF INSULIN (HCC): ICD-10-CM

## 2020-12-14 RX ORDER — METFORMIN HYDROCHLORIDE 850 MG/1
TABLET ORAL
Qty: 180 TAB | Refills: 0 | Status: CANCELLED | OUTPATIENT
Start: 2020-12-14

## 2020-12-15 ENCOUNTER — OFFICE VISIT (OUTPATIENT)
Dept: PRIMARY CARE CLINIC | Age: 50
End: 2020-12-15
Payer: COMMERCIAL

## 2020-12-15 VITALS
RESPIRATION RATE: 18 BRPM | TEMPERATURE: 97.5 F | BODY MASS INDEX: 26.62 KG/M2 | OXYGEN SATURATION: 97 % | HEIGHT: 61 IN | HEART RATE: 99 BPM | SYSTOLIC BLOOD PRESSURE: 100 MMHG | WEIGHT: 141 LBS | DIASTOLIC BLOOD PRESSURE: 68 MMHG

## 2020-12-15 DIAGNOSIS — Z00.00 PHYSICAL EXAM: Primary | ICD-10-CM

## 2020-12-15 DIAGNOSIS — E11.9 CONTROLLED TYPE 2 DIABETES MELLITUS WITHOUT COMPLICATION, WITHOUT LONG-TERM CURRENT USE OF INSULIN (HCC): ICD-10-CM

## 2020-12-15 DIAGNOSIS — Z79.4 CONTROLLED TYPE 2 DIABETES MELLITUS WITHOUT COMPLICATION, WITH LONG-TERM CURRENT USE OF INSULIN (HCC): ICD-10-CM

## 2020-12-15 DIAGNOSIS — E03.9 ACQUIRED HYPOTHYROIDISM: ICD-10-CM

## 2020-12-15 DIAGNOSIS — C57.02 CARCINOMA OF LEFT FALLOPIAN TUBE (HCC): ICD-10-CM

## 2020-12-15 DIAGNOSIS — Z12.31 ENCOUNTER FOR SCREENING MAMMOGRAM FOR MALIGNANT NEOPLASM OF BREAST: ICD-10-CM

## 2020-12-15 DIAGNOSIS — E11.9 CONTROLLED TYPE 2 DIABETES MELLITUS WITHOUT COMPLICATION, WITH LONG-TERM CURRENT USE OF INSULIN (HCC): ICD-10-CM

## 2020-12-15 PROCEDURE — 99396 PREV VISIT EST AGE 40-64: CPT | Performed by: INTERNAL MEDICINE

## 2020-12-15 RX ORDER — INSULIN GLARGINE 100 [IU]/ML
INJECTION, SOLUTION SUBCUTANEOUS
Qty: 45 ML | Refills: 2 | Status: SHIPPED | OUTPATIENT
Start: 2020-12-15 | End: 2021-12-07 | Stop reason: ALTCHOICE

## 2020-12-15 RX ORDER — PEN NEEDLE, DIABETIC 31 GX3/16"
NEEDLE, DISPOSABLE MISCELLANEOUS
Qty: 300 PEN NEEDLE | Refills: 6 | Status: SHIPPED | OUTPATIENT
Start: 2020-12-15 | End: 2022-03-03

## 2020-12-15 RX ORDER — METFORMIN HYDROCHLORIDE 850 MG/1
850 TABLET ORAL 2 TIMES DAILY WITH MEALS
Qty: 180 TAB | Refills: 1 | Status: SHIPPED | OUTPATIENT
Start: 2020-12-15 | End: 2021-06-06

## 2020-12-15 NOTE — PROGRESS NOTES
Written by Kamlesh Coats, as dictated by Dr. Sophia Barth MD.    Laquita Amaral is a 48 y.o. female. HPI  The patient comes in today for a complete physical examination. She finished chemotherapy and had been just started on maintenance medication when she had a fever right after Thanksgiving weekend. She went to the ED and her labs showed that  Her hemoglobin had gone down to 5, and she was given a blood transfusion. Her oncologist suspects she is bleeding internally and stopped her maintenance medication. She is scheduled for a colonoscopy and endoscopy this Friday. She is off of her maintenance medication until the results of this come back and her oncologist may restart her on a lower dose depending on the results. She gets numbness and tingling in her lower legs and fingers, and she also gets pain in the R side of her jaw when she is using her R arm a lot, such as stirring up food while cooking or using a hand tool while gardening. It is not exacerbated by chewing food. Her labs drawn on 12/11/20 showed elevated triglyceride (369). She notes that she has been at home eating more rice and carbohydrates and was not always out walking. However, she has been walking more recently. She does not get Pap smears anymore because she is s/p hysterectomy. She has not gotten her mammogram yet this year. Patient Active Problem List   Diagnosis Code    AR (allergic rhinitis) J30.9    Hypercholesterolemia E78.00    S/P laparotomy Z98.890    Carcinomatosis ([facility] C80.0    Fallopian tube cancer, carcinoma ([facility] C57.00        Current Outpatient Medications on File Prior to Visit   Medication Sig Dispense Refill    insulin glargine (Basaglar KwikPen U-100 Insulin) 100 unit/mL (3 mL) inpn INJECT 20 UNITS SUBCUTANEOUSLY ONCE DAILY 45 mL 1    glucose blood VI test strips (Accu-Chek Mirian Plus test strp) strip Use four times a day and PRN.  300 Strip 2    nortriptyline (PAMELOR) 25 mg capsule Take 25 mg by mouth nightly.  gabapentin (NEURONTIN) 600 mg tablet Take 600 mg by mouth daily.  magnesium oxide 250 mg magnesium tablet Take 250 mg by mouth daily.  melatonin 5 mg tablet Take 5 mg by mouth nightly.  glucose blood VI test strips (ACCU-CHEK ACTIVE TEST) strip Use QID and  Strip 5    glucose blood VI test strips (ASCENSIA AUTODISC VI, ONE TOUCH ULTRA TEST VI) strip Glucose one touch test strips 100 Strip 11    b complex vitamins (B COMPLEX 1) tablet Take 1 Tab by mouth daily.  Lancets misc Use as directed. 1 Each 11    lidocaine-prilocaine (EMLA) topical cream Apply small amount over port area and cover with band aid 1 hour before chemo 30 g 2    [DISCONTINUED] Insulin Needles, Disposable, (BD Kelly 2nd Gen Pen Needle) 32 gauge x 5/32\" ndle USE 2 NEEDLES  DAILY TO ADMINISTER 8 UNITS OF LANTUS SUBCUTANEOUSLY 300 Pen Needle 6    [DISCONTINUED] insulin glargine (Basaglar KwikPen U-100 Insulin) 100 unit/mL (3 mL) inpn Inject subcutaneous 14 units in am and 16 units in the pm. This is a total of 30 units daily 45 mL 0    [DISCONTINUED] metFORMIN (GLUCOPHAGE) 850 mg tablet TAKE 1 TABLET BY MOUTH TWICE A DAY WITH MEALS 180 Tab 0    ondansetron (ZOFRAN ODT) 8 mg disintegrating tablet       Blood-Glucose Meter monitoring kit Check blood sugar 3 times a day. 1 Kit 0    [DISCONTINUED] gabapentin (NEURONTIN) 300 mg capsule Take 1 Cap by mouth three (3) times daily. 270 Cap 0    liposomal DOXOrubicin, DOXIL/LIPODOX, (DOXIL) 2 mg/mL injection by IntraVENous route once.  cetirizine (ZYRTEC) 10 mg tablet Take  by mouth.  CRANIAL PROSTHESIS misc Cranial Prosthesis  Diagnosis code  Cpt code 1 Each 2     No current facility-administered medications on file prior to visit. Allergies   Allergen Reactions    Diphenhydramine-Acetaminophen Rash    Tylenol [Acetaminophen] Swelling     Tylenol pm only  SLURRED SPEECH.        Past Medical History: Diagnosis Date    AR (allergic rhinitis) 2010    Asthma 2010    ALLERGY RELATED; NONE USED SINCE     Cancer [facility])     OVARIAN AND FALLOPIAN TUBE    Cancer ([address] 2019    ovarian cancer matasticized to bladder.  DM mellitus, gestational 2010    PRE DIABETIC    Elevated TSH 2010    Family history of diabetes mellitus (DM)     History of ovarian cancer 2016    Hypercholesterolemia 2011    elevated particle number    Ill-defined condition 16    HYDRONEPHROSIS, LEFT RENAL MASS (BEING WORKED UP BY DR MC Tinoco.)    Impaired glucose tolerance 2011    Menopause 2016    Other activity     BCG vaccine as a child    S/P laparotomy 2016    Uterine fibroid 2010    ENDOMETRIOSIS    Vitamin D deficiency 2010       Past Surgical History:   Procedure Laterality Date    ABDOMEN SURGERY PROC UNLISTED  2016    EXP LAP FOR BOWEL OBST.     HX APPENDECTOMY      HX  SECTION  ,     HX GYN  16    hysterectomy, TUBES REMOVED  ([facility])    HX HYSTERECTOMY  2016    HX OOPHORECTOMY Bilateral 2016    HX OTHER SURGICAL      small bowel obstruction, ILEUS POSTOP    HX VASCULAR ACCESS      PORT A CATH L CHEST WALL    HX WISDOM TEETH EXTRACTION      age 28       Family History   Problem Relation Age of Onset    Diabetes Mother     Diabetes Father     Hypertension Father     Diabetes Maternal Grandmother     Diabetes Maternal Grandfather     No Known Problems Sister     Diabetes Paternal Uncle     Anesth Problems Neg Hx        Social History     Socioeconomic History    Marital status:      Spouse name: Not on file    Number of children: Not on file    Years of education: Not on file    Highest education level: Not on file   Occupational History    Not on file   Social Needs    Financial resource strain: Not on file    Food insecurity     Worry: Not on file     Inability: Not on file  Transportation needs     Medical: Not on file     Non-medical: Not on file   Tobacco Use    Smoking status: Never Smoker    Smokeless tobacco: Never Used   Substance and Sexual Activity    Alcohol use: No    Drug use: No    Sexual activity: Yes     Partners: Male   Lifestyle    Physical activity     Days per week: Not on file     Minutes per session: Not on file    Stress: Not on file   Relationships    Social connections     Talks on phone: Not on file     Gets together: Not on file     Attends Oriental orthodox service: Not on file     Active member of club or organization: Not on file     Attends meetings of clubs or organizations: Not on file     Relationship status: Not on file    Intimate partner violence     Fear of current or ex partner: Not on file     Emotionally abused: Not on file     Physically abused: Not on file     Forced sexual activity: Not on file   Other Topics Concern    Not on file   Social History Narrative    Not on file       Orders Only on 12/11/2020   Component Date Value Ref Range Status    Sodium 12/11/2020 138  136 - 145 mmol/L Final    Potassium 12/11/2020 3.9  3.5 - 5.1 mmol/L Final    Chloride 12/11/2020 106  97 - 108 mmol/L Final    CO2 12/11/2020 27  21 - 32 mmol/L Final    Anion gap 12/11/2020 5  5 - 15 mmol/L Final    Glucose 12/11/2020 125* 65 - 100 mg/dL Final    BUN 12/11/2020 10  6 - 20 MG/DL Final    Creatinine 12/11/2020 0.64  0.55 - 1.02 MG/DL Final    BUN/Creatinine ratio 12/11/2020 16  12 - 20   Final    GFR est AA 12/11/2020 >60  >60 ml/min/1.73m2 Final    GFR est non-AA 12/11/2020 >60  >60 ml/min/1.73m2 Final    Comment: Estimated GFR is calculated using the IDMS-traceable Modification of Diet in  Renal Disease (MDRD) Study equation, reported for both  Americans  (GFRAA) and non- Americans (GFRNA), and normalized to 1.73m2 body  surface area. The physician must decide which value applies to the patient.       Calcium 12/11/2020 8.9  8.5 - 10.1 MG/DL Final    Bilirubin, total 12/11/2020 0.4  0.2 - 1.0 MG/DL Final    ALT (SGPT) 12/11/2020 51  12 - 78 U/L Final    AST (SGOT) 12/11/2020 26  15 - 37 U/L Final    Alk. phosphatase 12/11/2020 82  45 - 117 U/L Final    Protein, total 12/11/2020 7.6  6.4 - 8.2 g/dL Final    Albumin 12/11/2020 3.9  3.5 - 5.0 g/dL Final    Globulin 12/11/2020 3.7  2.0 - 4.0 g/dL Final    A-G Ratio 12/11/2020 1.1  1.1 - 2.2   Final    LIPID PROFILE 12/11/2020        Final    Cholesterol, total 12/11/2020 134  <200 MG/DL Final    Triglyceride 12/11/2020 369* <150 MG/DL Final    Comment: Based on NCEP-ATP III:  Triglycerides <150 mg/dL  is considered normal, 150-199  mg/dL  borderline high,  200-499 mg/dL high and  greater than or equal to 500  mg/dL very high.  HDL Cholesterol 12/11/2020 29  MG/DL Final    Comment: Based on NCEP ATP III, HDL Cholesterol <40 mg/dL is considered low and >60  mg/dL is elevated.  LDL, calculated 12/11/2020 31.2  0 - 100 MG/DL Final    Comment: Based on the NCEP-ATP: LDL-C concentrations are considered  optimal <100 mg/dL,  near optimal/above Normal 100-129 mg/dL Borderline High: 130-159, High: 160-189  mg/dL Very High: Greater than or equal to 190 mg/dL      VLDL, calculated 12/11/2020 73.8  MG/DL Final    CHOL/HDL Ratio 12/11/2020 4.6  0.0 - 5.0   Final    TSH 12/11/2020 1.81  0.36 - 3.74 uIU/mL Final    Comment:   Due to TSH heterogeneity, both structurally and degree of glycosylation,  monoclonal antibodies used in the TSH assay may not accurately quantitate TSH.   Therefore, this result should be correlated with clinical findings as well as  with other assessments of thyroid function, e.g., free T4, free T3.      WBC 12/11/2020 6.3  3.6 - 11.0 K/uL Final    RBC 12/11/2020 2.84* 3.80 - 5.20 M/uL Final    HGB 12/11/2020 9.4* 11.5 - 16.0 g/dL Final    HCT 12/11/2020 28.8* 35.0 - 47.0 % Final    MCV 12/11/2020 101.4* 80.0 - 99.0 FL Final    MCH 12/11/2020 33.1  26.0 - 34.0 PG Final    MCHC 12/11/2020 32.6  30.0 - 36.5 g/dL Final    RDW 12/11/2020 18.6* 11.5 - 14.5 % Final    PLATELET 23/94/9066 268* 150 - 400 K/uL Final    MPV 12/11/2020 10.3  8.9 - 12.9 FL Final    NRBC 12/11/2020 0.0  0  WBC Final    ABSOLUTE NRBC 12/11/2020 0.00  0.00 - 0.01 K/uL Final    NEUTROPHILS 12/11/2020 46  32 - 75 % Final    LYMPHOCYTES 12/11/2020 40  12 - 49 % Final    MONOCYTES 12/11/2020 9  5 - 13 % Final    EOSINOPHILS 12/11/2020 5  0 - 7 % Final    BASOPHILS 12/11/2020 0  0 - 1 % Final    IMMATURE GRANULOCYTES 12/11/2020 0  0.0 - 0.5 % Final    ABS. NEUTROPHILS 12/11/2020 2.9  1.8 - 8.0 K/UL Final    ABS. LYMPHOCYTES 12/11/2020 2.5  0.8 - 3.5 K/UL Final    ABS. MONOCYTES 12/11/2020 0.5  0.0 - 1.0 K/UL Final    ABS. EOSINOPHILS 12/11/2020 0.3  0.0 - 0.4 K/UL Final    ABS. BASOPHILS 12/11/2020 0.0  0.0 - 0.1 K/UL Final    ABS. IMM. GRANS. 12/11/2020 0.0  0.00 - 0.04 K/UL Final    DF 12/11/2020 AUTOMATED    Final    Vitamin D 25-Hydroxy 12/11/2020 24.6* 30 - 100 ng/mL Final    Comment: (NOTE)  Deficiency               <20 ng/mL  Insufficiency          20-30 ng/mL  Sufficient             ng/mL  Possible toxicity       >100 ng/mL    The Method used is Siemens Advia Centaur currently standardized to a   Center of Disease Control and Prevention (CDC) certified reference   [address]. Samples containing fluorescein dye can produce falsely   elevated values when tested with the ADVIA Centaur Vitamin D Assay. It is recommended that results in the toxic range, >100 ng/mL, be   retested 72 hours post fluorescein exposure.       Hemoglobin A1c 12/11/2020 5.9* 4.0 - 5.6 % Final    Comment: NEW METHOD PLEASE NOTE NEW REFERENCE RANGE  (NOTE)  HbA1C Interpretive Ranges  <5.7              Normal  5.7 - 6.4         Consider Prediabetes  >6.5              Consider Diabetes      Est. average glucose 12/11/2020 123  mg/dL Final    Microalbumin,urine random 12/11/2020 1.82  MG/DL Final No reference range has been established.  Creatinine, urine 12/11/2020 189.00  mg/dL Final    No reference range has been established.  Microalbumin/Creat ratio (mg/g cre* 12/11/2020 10  0 - 30 mg/g Final     Review of Systems   Constitutional: Negative for malaise/fatigue and weight loss. HENT: Negative for congestion and sore throat. Eyes: Negative for blurred vision. Respiratory: Negative for cough and shortness of breath. Cardiovascular: Negative for chest pain and leg swelling. Gastrointestinal: Negative for constipation and heartburn. Genitourinary: Negative for frequency and urgency. Musculoskeletal: Negative for back pain, joint pain and myalgias. Neurological: Negative for dizziness and headaches. Psychiatric/Behavioral: Negative for depression. The patient is not nervous/anxious and does not have insomnia. Visit Vitals  /68 (BP 1 Location: Left arm, BP Patient Position: Sitting)   Pulse 99   Temp 97.5 °F (36.4 °C) (Temporal)   Resp 18   Ht 5' 1\" (1.549 m)   Wt 141 lb (64 kg)   LMP 07/05/2013   SpO2 97%   BMI 26.64 kg/m²     Physical Exam  Vitals signs and nursing note reviewed. Constitutional:       General: She is not in acute distress. Appearance: Normal appearance. She is well-developed, well-groomed and normal weight. She is not diaphoretic. HENT:      Right Ear: Tympanic membrane, ear canal and external ear normal.      Left Ear: Tympanic membrane, ear canal and external ear normal.      Mouth/Throat:      Mouth: Mucous membranes are moist.      Pharynx: Oropharynx is clear. Eyes:      General: No scleral icterus. Right eye: No discharge. Left eye: No discharge. Extraocular Movements: Extraocular movements intact. Neck:      Musculoskeletal: Normal range of motion and neck supple. Thyroid: No thyromegaly. Cardiovascular:      Rate and Rhythm: Normal rate and regular rhythm. Pulses: Normal pulses.            Dorsalis pedis pulses are 2+ on the right side and 2+ on the left side. Pulmonary:      Effort: Pulmonary effort is normal.      Breath sounds: Normal breath sounds. No wheezing. Abdominal:      General: Bowel sounds are normal. There is no distension. Tenderness: There is no abdominal tenderness. Musculoskeletal:      Right lower leg: No edema. Left lower leg: No edema. Comments: B/L knees without crepitus   Feet:      Comments: Diabetic foot exam:   Left: Vibratory sensation normal    Sharp/dull discrimination normal    Filament test normal sensation with micro filament   Pulse DP: 2+ (normal)   Deformities: None  Right: Vibratory sensation normal   Sharp/dull discrimination normal   Filament test normal sensation with micro filament   Pulse DP: 2+ (normal)   Deformities: None  Lymphadenopathy:      Cervical: No cervical adenopathy. Neurological:      Mental Status: She is alert and oriented to person, place, and time. Deep Tendon Reflexes:      Reflex Scores:       Patellar reflexes are 2+ on the right side and 2+ on the left side. Psychiatric:         Mood and Affect: Mood and affect normal.         Behavior: Behavior normal.       ASSESSMENT and PLAN    ICD-10-CM ICD-9-CM    1. Physical exam  Z00.00 V70.9 Complete physical exam done. Labs reviewed with pt.     2. Controlled type 2 diabetes mellitus without complication, without long-term current use of insulin ([facility])  E11.9 250.00 HM DIABETES FOOT EXAM    Diabetic foot exam normal.         insulin glargine (Basaglar KwikPen U-100 Insulin) 100 unit/mL (3 mL) inpn sent to pharmacy. I refilled her insulin. Insulin Needles, Disposable, (BD Kelly 2nd Gen Pen Needle) 32 gauge x 5/32\" ndle sent to pharmacy. metFORMIN (GLUCOPHAGE) 850 mg tablet sent to pharmacy. I refilled her metformin. I instructed her to stop taking metformin and insulin the day before her colonoscopy and endoscopy.        3. Carcinoma of left fallopian tube ([facility]  C57.02 183. 2 She follows with oncology and they are waiting on her colonoscopy results before deciding on a next step for treatment. 4. Acquired hypothyroidism  E03.9 244.9 Her most recent TSH was normal. Will monitor for changes or improvements. 5. Encounter for screening mammogram for malignant neoplasm of breast  Z12.31 V76.12 LINDY 3D MAGALYS W MAMMO BI SCREENING INCL CAD    I ordered a mammogram for health maintenance. 6. Controlled type 2 diabetes mellitus without complication, with long-term current use of insulin ([facility])  E11.9 250.00 I instructed her to stop taking metformin and insulin the day before her colonoscopy and endoscopy. Z79.4 V58.67       This plan was reviewed with the patient and patient agrees. All questions were answered. This scribe documentation was reviewed by me and accurately reflects the examination and decisions made by me.

## 2020-12-18 LAB — COLONOSCOPY, EXTERNAL: NORMAL

## 2021-06-06 DIAGNOSIS — E11.9 CONTROLLED TYPE 2 DIABETES MELLITUS WITHOUT COMPLICATION, WITHOUT LONG-TERM CURRENT USE OF INSULIN (HCC): ICD-10-CM

## 2021-06-06 RX ORDER — METFORMIN HYDROCHLORIDE 850 MG/1
TABLET ORAL
Qty: 180 TABLET | Refills: 1 | Status: SHIPPED | OUTPATIENT
Start: 2021-06-06 | End: 2021-12-05

## 2021-07-19 ENCOUNTER — OFFICE VISIT (OUTPATIENT)
Dept: PRIMARY CARE CLINIC | Age: 51
End: 2021-07-19
Payer: COMMERCIAL

## 2021-07-19 VITALS
SYSTOLIC BLOOD PRESSURE: 113 MMHG | RESPIRATION RATE: 15 BRPM | DIASTOLIC BLOOD PRESSURE: 77 MMHG | HEIGHT: 61 IN | TEMPERATURE: 97.5 F | WEIGHT: 139.6 LBS | OXYGEN SATURATION: 98 % | BODY MASS INDEX: 26.36 KG/M2 | HEART RATE: 84 BPM

## 2021-07-19 DIAGNOSIS — E55.9 VITAMIN D DEFICIENCY: ICD-10-CM

## 2021-07-19 DIAGNOSIS — Z11.59 NEED FOR HEPATITIS C SCREENING TEST: ICD-10-CM

## 2021-07-19 DIAGNOSIS — Z79.4 CONTROLLED TYPE 2 DIABETES MELLITUS WITHOUT COMPLICATION, WITH LONG-TERM CURRENT USE OF INSULIN (HCC): Primary | ICD-10-CM

## 2021-07-19 DIAGNOSIS — T45.1X5A PERIPHERAL NEUROPATHY DUE TO CHEMOTHERAPY (HCC): ICD-10-CM

## 2021-07-19 DIAGNOSIS — G62.0 PERIPHERAL NEUROPATHY DUE TO CHEMOTHERAPY (HCC): ICD-10-CM

## 2021-07-19 DIAGNOSIS — Z12.11 COLON CANCER SCREENING: ICD-10-CM

## 2021-07-19 DIAGNOSIS — Z85.43 HISTORY OF OVARIAN CANCER: ICD-10-CM

## 2021-07-19 DIAGNOSIS — E11.9 CONTROLLED TYPE 2 DIABETES MELLITUS WITHOUT COMPLICATION, WITH LONG-TERM CURRENT USE OF INSULIN (HCC): Primary | ICD-10-CM

## 2021-07-19 LAB
25(OH)D3 SERPL-MCNC: 39.2 NG/ML (ref 30–100)
ALBUMIN SERPL-MCNC: 4.2 G/DL (ref 3.5–5)
ALBUMIN/GLOB SERPL: 1.1 {RATIO} (ref 1.1–2.2)
ALP SERPL-CCNC: 69 U/L (ref 45–117)
ALT SERPL-CCNC: 79 U/L (ref 12–78)
ANION GAP SERPL CALC-SCNC: 4 MMOL/L (ref 5–15)
AST SERPL-CCNC: 46 U/L (ref 15–37)
BILIRUB SERPL-MCNC: 0.6 MG/DL (ref 0.2–1)
BUN SERPL-MCNC: 11 MG/DL (ref 6–20)
BUN/CREAT SERPL: 16 (ref 12–20)
CALCIUM SERPL-MCNC: 9 MG/DL (ref 8.5–10.1)
CHLORIDE SERPL-SCNC: 107 MMOL/L (ref 97–108)
CHOLEST SERPL-MCNC: 140 MG/DL
CO2 SERPL-SCNC: 30 MMOL/L (ref 21–32)
CREAT SERPL-MCNC: 0.7 MG/DL (ref 0.55–1.02)
CREAT UR-MCNC: 129 MG/DL
ERYTHROCYTE [DISTWIDTH] IN BLOOD BY AUTOMATED COUNT: 14.4 % (ref 11.5–14.5)
EST. AVERAGE GLUCOSE BLD GHB EST-MCNC: 120 MG/DL
GLOBULIN SER CALC-MCNC: 3.7 G/DL (ref 2–4)
GLUCOSE SERPL-MCNC: 116 MG/DL (ref 65–100)
HBA1C MFR BLD: 5.8 % (ref 4–5.6)
HCT VFR BLD AUTO: 35.7 % (ref 35–47)
HCV AB SERPL QL IA: NONREACTIVE
HCV COMMENT,HCGAC: NORMAL
HDLC SERPL-MCNC: 42 MG/DL
HDLC SERPL: 3.3 {RATIO} (ref 0–5)
HGB BLD-MCNC: 11.5 G/DL (ref 11.5–16)
LDLC SERPL CALC-MCNC: 74.4 MG/DL (ref 0–100)
MCH RBC QN AUTO: 34.3 PG (ref 26–34)
MCHC RBC AUTO-ENTMCNC: 32.2 G/DL (ref 30–36.5)
MCV RBC AUTO: 106.6 FL (ref 80–99)
MICROALBUMIN UR-MCNC: 3.34 MG/DL
MICROALBUMIN/CREAT UR-RTO: 26 MG/G (ref 0–30)
NRBC # BLD: 0 K/UL (ref 0–0.01)
NRBC BLD-RTO: 0 PER 100 WBC
PLATELET # BLD AUTO: 121 K/UL (ref 150–400)
PMV BLD AUTO: 9.5 FL (ref 8.9–12.9)
POTASSIUM SERPL-SCNC: 4.1 MMOL/L (ref 3.5–5.1)
PROT SERPL-MCNC: 7.9 G/DL (ref 6.4–8.2)
RBC # BLD AUTO: 3.35 M/UL (ref 3.8–5.2)
SODIUM SERPL-SCNC: 141 MMOL/L (ref 136–145)
TRIGL SERPL-MCNC: 118 MG/DL (ref ?–150)
VLDLC SERPL CALC-MCNC: 23.6 MG/DL
WBC # BLD AUTO: 5.3 K/UL (ref 3.6–11)

## 2021-07-19 PROCEDURE — 99214 OFFICE O/P EST MOD 30 MIN: CPT | Performed by: INTERNAL MEDICINE

## 2021-07-19 RX ORDER — OLAPARIB 150 MG/1
TABLET, FILM COATED ORAL
COMMUNITY
Start: 2021-06-28 | End: 2022-04-25 | Stop reason: ALTCHOICE

## 2021-07-19 RX ORDER — MELATONIN
DAILY
COMMUNITY

## 2021-07-19 RX ORDER — OLAPARIB 100 MG/1
TABLET, FILM COATED ORAL
COMMUNITY
Start: 2021-06-28 | End: 2021-12-07 | Stop reason: ALTCHOICE

## 2021-07-19 RX ORDER — FOLIC ACID 1 MG/1
TABLET ORAL DAILY
COMMUNITY

## 2021-07-19 NOTE — PROGRESS NOTES
Soraya Gibson (: 1970) is a 46 y.o. female, established patient, here for evaluation of the following chief complaint(s):  Follow-up (A1c check, patient has a hx of ovarian cancer- may be having surgery on Aug 10)     Written by Evin Zuniga, as dictated by Dr. Leta Robles MD.      ASSESSMENT/PLAN:  Below is the assessment and plan developed based on review of pertinent history, physical exam, labs, studies, and medications. 1. Controlled type 2 diabetes mellitus without complication, with long-term current use of insulin (HCC)  Continue taking metformin 850 mg and insulin glargine 14 units and 16 units. Ordered lab work to check metabolic panel, F0E levels, lipid panel, CBC levels, and kidney function. Waiting for results. Foot exam was completed. -     METABOLIC PANEL, COMPREHENSIVE; Future  -     HEMOGLOBIN A1C WITH EAG; Future  -     LIPID PANEL; Future  -     CBC W/O DIFF; Future  -     HM DIABETES FOOT EXAM  -     MICROALBUMIN, UR, RAND W/ MICROALB/CREAT RATIO; Future    2. Need for hepatitis C screening test  Ordered Hepatitis C test. Waiting for results.  -     HEPATITIS C AB; Future    3. Colon cancer screening  I ordered a stool test for health maintenance. Waiting for results. -     OCCULT BLOOD IMMUNOASSAY,DIAGNOSTIC; Future    4. History of ovarian cancer  Followed by Dr. Nick Shrestha, Gynecological Oncology. Ask Dr. Nick Shrestha about tetanus, Shringrex, and pneumonia vaccinations before cancer treatments. 5. Peripheral neuropathy due to chemotherapy ([facility] [address]  Continue taking gabapentin 600 mg as prescribed. 6. Vitamin D deficiency  Ordered lab work to check Vitamin D levels. Waiting for results. Recommend that patient take a Vitamin D supplement of 1,000 units daily. -     VITAMIN D, 25 HYDROXY; Future      SUBJECTIVE/OBJECTIVE:  HPI    Patient presents today for a follow-up visit.  She has a reoccurrence of ovarian cancer which is followed by Dr. Nick Shrestha, Gynecological Oncology. She is completing a surgery and chemo for the cancer. She completed a PET scan and the cancer did not metastasize. She still has neuropathy. She takes gabapentin for her sxs but with minimal relief. She takes nortiptyline for sleep assistance. She takes metformin and insulin for diabetes. She takes zofran for nausea. She completed a colonoscopy last year which was unremarkable. Patient Active Problem List   Diagnosis Code    AR (allergic rhinitis) J30.9    Hypercholesterolemia E78.00    S/P laparotomy Z98.890    Carcinomatosis ([facility] [address] C80.0    Fallopian tube cancer, carcinoma ([facility] [address] C57.00        Current Outpatient Medications on File Prior to Visit   Medication Sig Dispense Refill    folic acid (FOLVITE) 1 mg tablet Take  by mouth daily.  cholecalciferol (Vitamin D3) (1000 Units /25 mcg) tablet Take  by mouth daily.  Lynparza 150 mg tablet       Lynparza 100 mg tab tablet       metFORMIN (GLUCOPHAGE) 850 mg tablet TAKE 1 TAB BY MOUTH TWO (2) TIMES DAILY (WITH MEALS) FOR 90  Tablet 1    insulin glargine (Basaglar KwikPen U-100 Insulin) 100 unit/mL (3 mL) inpn Inject subcutaneous 14 units in am and 16 units in the pm. This is a total of 30 units daily 45 mL 2    Insulin Needles, Disposable, (BD Kelly 2nd Gen Pen Needle) 32 gauge x 5/32\" ndle USE 2 NEEDLES  DAILY TO ADMINISTER 8 UNITS OF LANTUS SUBCUTANEOUSLY 300 Pen Needle 6    insulin glargine (Basaglar KwikPen U-100 Insulin) 100 unit/mL (3 mL) inpn INJECT 20 UNITS SUBCUTANEOUSLY ONCE DAILY 45 mL 1    glucose blood VI test strips (Accu-Chek Mirian Plus test strp) strip Use four times a day and PRN. 300 Strip 2    nortriptyline (PAMELOR) 25 mg capsule Take 25 mg by mouth nightly.  magnesium oxide 250 mg magnesium tablet Take 250 mg by mouth daily.  melatonin 5 mg tablet Take 5 mg by mouth nightly.       ondansetron (ZOFRAN ODT) 8 mg disintegrating tablet       Blood-Glucose Meter monitoring kit Check blood sugar 3 times a day. 1 Kit 0    glucose blood VI test strips (ACCU-CHEK ACTIVE TEST) strip Use QID and  Strip 5    glucose blood VI test strips (ASCENSIA AUTODISC VI, ONE TOUCH ULTRA TEST VI) strip Glucose one touch test strips 100 Strip 11    b complex vitamins (B COMPLEX 1) tablet Take 1 Tab by mouth daily.  Lancets misc Use as directed. 1 Each 11    CRANIAL PROSTHESIS misc Cranial Prosthesis  Diagnosis code  Cpt code 1 Each 2    gabapentin (NEURONTIN) 600 mg tablet Take 600 mg by mouth daily.  liposomal DOXOrubicin, DOXIL/LIPODOX, (DOXIL) 2 mg/mL injection by IntraVENous route once. (Patient not taking: Reported on 7/19/2021)      cetirizine (ZYRTEC) 10 mg tablet Take  by mouth. (Patient not taking: Reported on 7/19/2021)      lidocaine-prilocaine (EMLA) topical cream Apply small amount over port area and cover with band aid 1 hour before chemo (Patient not taking: Reported on 7/19/2021) 30 g 2     No current facility-administered medications on file prior to visit. Allergies   Allergen Reactions    Diphenhydramine-Acetaminophen Rash    Tylenol [Acetaminophen] Swelling     Tylenol pm only  SLURRED SPEECH. Past Medical History:   Diagnosis Date    AR (allergic rhinitis) 4/21/2010    Asthma 4/21/2010    ALLERGY RELATED; NONE USED SINCE 2010    Cancer [facility])     OVARIAN AND FALLOPIAN TUBE    Cancer ([facility] [address] 12/2019    ovarian cancer matasticized to bladder.      DM mellitus, gestational 4/21/2010    PRE DIABETIC    Elevated TSH 4/21/2010    Family history of diabetes mellitus (DM)     History of ovarian cancer 06/2016    Hypercholesterolemia 11/2011    elevated particle number    Ill-defined condition 6/16/16    HYDRONEPHROSIS, LEFT RENAL MASS (BEING WORKED UP BY DR MC Obregon, UROL.)    Impaired glucose tolerance 12/19/2011    Menopause 06/2016    Other activity     BCG vaccine as a child    S/P laparotomy 5/23/2016    Uterine fibroid 4/21/2010 ENDOMETRIOSIS    Vitamin D deficiency 2010       Past Surgical History:   Procedure Laterality Date    HX APPENDECTOMY      HX  SECTION  ,     HX GYN  16    hysterectomy, TUBES REMOVED  ([facility]    HX HYSTERECTOMY  2016    HX OOPHORECTOMY Bilateral 2016    HX OTHER SURGICAL      small bowel obstruction, ILEUS POSTOP    HX VASCULAR ACCESS      PORT A CATH L CHEST WALL    HX WISDOM TEETH EXTRACTION      age 28    ID ABDOMEN SURGERY PROC UNLISTED  2016    EXP LAP FOR BOWEL OBST. Family History   Problem Relation Age of Onset    Diabetes Mother     Diabetes Father     Hypertension Father     Diabetes Maternal Grandmother     Diabetes Maternal Grandfather     No Known Problems Sister     Diabetes Paternal Uncle     Anesth Problems Neg Hx        Social History     Socioeconomic History    Marital status:      Spouse name: Not on file    Number of children: Not on file    Years of education: Not on file    Highest education level: Not on file   Occupational History    Not on file   Tobacco Use    Smoking status: Never Smoker    Smokeless tobacco: Never Used   Vaping Use    Vaping Use: Never used   Substance and Sexual Activity    Alcohol use: No    Drug use: No    Sexual activity: Yes     Partners: Male   Other Topics Concern    Not on file   Social History Narrative    Not on file     Social Determinants of Health     Financial Resource Strain:     Difficulty of Paying Living Expenses:    Food Insecurity:     Worried About Running Out of Food in the Last Year:     Ran Out of Food in the Last Year:    Transportation Needs:     Lack of Transportation (Medical):      Lack of Transportation (Non-Medical):    Physical Activity:     Days of Exercise per Week:     Minutes of Exercise per Session:    Stress:     Feeling of Stress :    Social Connections:     Frequency of Communication with Friends and Family:     Frequency of Social Gatherings with Friends and Family:     Attends Baptism Services:     Active Member of Clubs or Organizations:     Attends Club or Organization Meetings:     Marital Status:    Intimate Partner Violence:     Fear of Current or Ex-Partner:     Emotionally Abused:     Physically Abused:     Sexually Abused:        No visits with results within 3 Month(s) from this visit. Latest known visit with results is:   Orders Only on 12/11/2020   Component Date Value Ref Range Status    Sodium 12/11/2020 138  136 - 145 mmol/L Final    Potassium 12/11/2020 3.9  3.5 - 5.1 mmol/L Final    Chloride 12/11/2020 106  97 - 108 mmol/L Final    CO2 12/11/2020 27  21 - 32 mmol/L Final    Anion gap 12/11/2020 5  5 - 15 mmol/L Final    Glucose 12/11/2020 125* 65 - 100 mg/dL Final    BUN 12/11/2020 10  6 - 20 MG/DL Final    Creatinine 12/11/2020 0.64  0.55 - 1.02 MG/DL Final    BUN/Creatinine ratio 12/11/2020 16  12 - 20   Final    GFR est AA 12/11/2020 >60  >60 ml/min/1.73m2 Final    GFR est non-AA 12/11/2020 >60  >60 ml/min/1.73m2 Final    Comment: Estimated GFR is calculated using the IDMS-traceable Modification of Diet in  Renal Disease (MDRD) Study equation, reported for both  Americans  (GFRAA) and non- Americans (GFRNA), and normalized to 1.73m2 body  surface area. The physician must decide which value applies to the patient.  Calcium 12/11/2020 8.9  8.5 - 10.1 MG/DL Final    Bilirubin, total 12/11/2020 0.4  0.2 - 1.0 MG/DL Final    ALT (SGPT) 12/11/2020 51  12 - 78 U/L Final    AST (SGOT) 12/11/2020 26  15 - 37 U/L Final    Alk.  phosphatase 12/11/2020 82  45 - 117 U/L Final    Protein, total 12/11/2020 7.6  6.4 - 8.2 g/dL Final    Albumin 12/11/2020 3.9  3.5 - 5.0 g/dL Final    Globulin 12/11/2020 3.7  2.0 - 4.0 g/dL Final    A-G Ratio 12/11/2020 1.1  1.1 - 2.2   Final    LIPID PROFILE 12/11/2020        Final    Cholesterol, total 12/11/2020 134  <200 MG/DL Final  Triglyceride 12/11/2020 369* <150 MG/DL Final    Comment: Based on NCEP-ATP III:  Triglycerides <150 mg/dL  is considered normal, 150-199  mg/dL  borderline high,  200-499 mg/dL high and  greater than or equal to 500  mg/dL very high.  HDL Cholesterol 12/11/2020 29  MG/DL Final    Comment: Based on NCEP ATP III, HDL Cholesterol <40 mg/dL is considered low and >60  mg/dL is elevated.  LDL, calculated 12/11/2020 31.2  0 - 100 MG/DL Final    Comment: Based on the NCEP-ATP: LDL-C concentrations are considered  optimal <100 mg/dL,  near optimal/above Normal 100-129 mg/dL Borderline High: 130-159, High: 160-189  mg/dL Very High: Greater than or equal to 190 mg/dL      VLDL, calculated 12/11/2020 73.8  MG/DL Final    CHOL/HDL Ratio 12/11/2020 4.6  0.0 - 5.0   Final    TSH 12/11/2020 1.81  0.36 - 3.74 uIU/mL Final    Comment:   Due to TSH heterogeneity, both structurally and degree of glycosylation,  monoclonal antibodies used in the TSH assay may not accurately quantitate TSH.   Therefore, this result should be correlated with clinical findings as well as  with other assessments of thyroid function, e.g., free T4, free T3.      WBC 12/11/2020 6.3  3.6 - 11.0 K/uL Final    RBC 12/11/2020 2.84* 3.80 - 5.20 M/uL Final    HGB 12/11/2020 9.4* 11.5 - 16.0 g/dL Final    HCT 12/11/2020 28.8* 35.0 - 47.0 % Final    MCV 12/11/2020 101.4* 80.0 - 99.0 FL Final    MCH 12/11/2020 33.1  26.0 - 34.0 PG Final    MCHC 12/11/2020 32.6  30.0 - 36.5 g/dL Final    RDW 12/11/2020 18.6* 11.5 - 14.5 % Final    PLATELET 72/25/4402 523* 150 - 400 K/uL Final    MPV 12/11/2020 10.3  8.9 - 12.9 FL Final    NRBC 12/11/2020 0.0  0  WBC Final    ABSOLUTE NRBC 12/11/2020 0.00  0.00 - 0.01 K/uL Final    NEUTROPHILS 12/11/2020 46  32 - 75 % Final    LYMPHOCYTES 12/11/2020 40  12 - 49 % Final    MONOCYTES 12/11/2020 9  5 - 13 % Final    EOSINOPHILS 12/11/2020 5  0 - 7 % Final    BASOPHILS 12/11/2020 0  0 - 1 % Final    IMMATURE GRANULOCYTES 12/11/2020 0  0.0 - 0.5 % Final    ABS. NEUTROPHILS 12/11/2020 2.9  1.8 - 8.0 K/UL Final    ABS. LYMPHOCYTES 12/11/2020 2.5  0.8 - 3.5 K/UL Final    ABS. MONOCYTES 12/11/2020 0.5  0.0 - 1.0 K/UL Final    ABS. EOSINOPHILS 12/11/2020 0.3  0.0 - 0.4 K/UL Final    ABS. BASOPHILS 12/11/2020 0.0  0.0 - 0.1 K/UL Final    ABS. IMM. GRANS. 12/11/2020 0.0  0.00 - 0.04 K/UL Final    DF 12/11/2020 AUTOMATED    Final    Vitamin D 25-Hydroxy 12/11/2020 24.6* 30 - 100 ng/mL Final    Comment: (NOTE)  Deficiency               <20 ng/mL  Insufficiency          20-30 ng/mL  Sufficient             ng/mL  Possible toxicity       >100 ng/mL    The Method used is Siemens Advia Centaur currently standardized to a   Center of Disease Control and Prevention (CDC) certified reference   [address]. Samples containing fluorescein dye can produce falsely   elevated values when tested with the ADVIA Centaur Vitamin D Assay. It is recommended that results in the toxic range, >100 ng/mL, be   retested 72 hours post fluorescein exposure.  Hemoglobin A1c 12/11/2020 5.9* 4.0 - 5.6 % Final    Comment: NEW METHOD PLEASE NOTE NEW REFERENCE RANGE  (NOTE)  HbA1C Interpretive Ranges  <5.7              Normal  5.7 - 6.4         Consider Prediabetes  >6.5              Consider Diabetes      Est. average glucose 12/11/2020 123  mg/dL Final    Microalbumin,urine random 12/11/2020 1.82  MG/DL Final    No reference range has been established.  Creatinine, urine 12/11/2020 189.00  mg/dL Final    No reference range has been established.  Microalbumin/Creat ratio (mg/g cre* 12/11/2020 10  0 - 30 mg/g Final       Review of Systems   Constitutional: Negative for activity change, fatigue and unexpected weight change. HENT: Negative for congestion, hearing loss, rhinorrhea and sore throat. Eyes: Negative for discharge. Respiratory: Negative for cough, chest tightness and shortness of breath.     Cardiovascular: Negative for leg swelling. Gastrointestinal: Negative for abdominal pain, constipation and diarrhea. Genitourinary: Negative for dysuria, flank pain, frequency and urgency. Musculoskeletal: Negative for arthralgias, back pain and myalgias. Skin: Negative for color change and rash. Neurological: Negative for dizziness, light-headedness and headaches. Psychiatric/Behavioral: Negative for dysphoric mood and sleep disturbance. The patient is not nervous/anxious. Visit Vitals  /77 (BP 1 Location: Right arm)   Pulse 84   Temp 97.5 °F (36.4 °C)   Resp 15   Ht 5' 1\" (1.549 m)   Wt 139 lb 9.6 oz (63.3 kg)   LMP 07/05/2013   SpO2 98%   BMI 26.38 kg/m²       Physical Exam  Vitals and nursing note reviewed. Constitutional:       General: She is not in acute distress. Appearance: Normal appearance. She is well-developed. She is not diaphoretic. HENT:      Right Ear: External ear normal.      Left Ear: External ear normal.   Eyes:      General: No scleral icterus. Right eye: No discharge. Left eye: No discharge. Extraocular Movements: Extraocular movements intact. Conjunctiva/sclera: Conjunctivae normal.   Cardiovascular:      Rate and Rhythm: Normal rate and regular rhythm. Pulmonary:      Effort: Pulmonary effort is normal.      Breath sounds: Normal breath sounds. No wheezing. Abdominal:      General: Bowel sounds are normal.      Palpations: Abdomen is soft. Tenderness: There is no abdominal tenderness. Musculoskeletal:      Cervical back: Normal range of motion and neck supple.    Feet:      Comments: Diabetic foot exam:     Left: Vibratory sensation normal    Sharp/dull discrimination normal    Filament test normal sensation with micro filament   Pulse DP: 2+ (normal)   Deformities: None  Right: Vibratory sensation normal   Sharp/dull discrimination normal   Filament test normal sensation with micro filament   Pulse DP: 2+ (normal)   Deformities: None    Lymphadenopathy:      Cervical: No cervical adenopathy. Neurological:      Mental Status: She is alert and oriented to person, place, and time. Psychiatric:         Mood and Affect: Mood and affect normal.               An electronic signature was used to authenticate this note.   -- Halima Ribera

## 2021-07-19 NOTE — PROGRESS NOTES
Chief Complaint   Patient presents with    Follow-up     A1c check, patient has a hx of ovarian cancer- may be having surgery on Aug 10       Visit Vitals  /77 (BP 1 Location: Right arm)   Pulse 84   Temp 97.5 °F (36.4 °C)   Resp 15   Ht 5' 1\" (1.549 m)   Wt 139 lb 9.6 oz (63.3 kg)   LMP 07/05/2013   SpO2 98%   BMI 26.38 kg/m²       1. Have you been to the ER, urgent care clinic since your last visit? Hospitalized since your last visit? No    2. Have you seen or consulted any other health care providers outside of the [address] [place] [address] since your last visit? Include any pap smears or colon screening.  Yes [facility]- Oncology

## 2021-07-24 NOTE — PROGRESS NOTES
Results reviewed. Release via [email or web address], it was nice seeing you in the office. Your blood report is back and I am happy to see your vitamin D has improved and your diabetes numbers have improved as well. Hemoglobin A1c came 5.8% which is in prediabetic range. Your cholesterol numbers have improved significantly, triglycerides are back to normal.  Your liver numbers are slightly elevated and most likely due to the side effect of chemotherapy. Your hemoglobin (iron level ) have improved from 9.4 to 11.5. Your platelet number slightly low but not in critical range. I would recommend continuing vitamin D current dose and discussing your liver numbers with the oncologist as a potential side effect of the chemotherapy. Follow-up appointment with me in 6-month. no

## 2021-07-27 ENCOUNTER — HOSPITAL ENCOUNTER (OUTPATIENT)
Dept: MAMMOGRAPHY | Age: 51
Discharge: HOME | End: 2021-07-27
Attending: INTERNAL MEDICINE
Payer: COMMERCIAL

## 2021-07-27 DIAGNOSIS — Z12.31 ENCOUNTER FOR SCREENING MAMMOGRAM FOR MALIGNANT NEOPLASM OF BREAST: ICD-10-CM

## 2021-07-27 PROCEDURE — 77063 BREAST TOMOSYNTHESIS BI: CPT

## 2021-07-28 NOTE — PROGRESS NOTES
Results reviewed. Release via [employer], it was nice seeing you in the office. I was wondering if you had received a call regarding your mammogram report?

## 2021-07-30 ENCOUNTER — HOSPITAL ENCOUNTER (OUTPATIENT)
Dept: MAMMOGRAPHY | Age: 51
Discharge: HOME | End: 2021-07-30
Attending: INTERNAL MEDICINE
Payer: COMMERCIAL

## 2021-07-30 DIAGNOSIS — R92.8 ABNORMAL MAMMOGRAM OF LEFT BREAST: ICD-10-CM

## 2021-07-30 PROCEDURE — 76642 ULTRASOUND BREAST LIMITED: CPT

## 2021-07-30 PROCEDURE — 77065 DX MAMMO INCL CAD UNI: CPT

## 2021-10-26 ENCOUNTER — TELEPHONE (OUTPATIENT)
Dept: PRIMARY CARE CLINIC | Age: 51
End: 2021-10-26

## 2021-10-26 NOTE — TELEPHONE ENCOUNTER
Patient has an appt or a physical on the 14th of December and would like to come do blood work the Friday before.

## 2021-11-30 ENCOUNTER — TELEPHONE (OUTPATIENT)
Dept: PRIMARY CARE CLINIC | Age: 51
End: 2021-11-30

## 2021-11-30 DIAGNOSIS — E11.9 CONTROLLED TYPE 2 DIABETES MELLITUS WITHOUT COMPLICATION, WITHOUT LONG-TERM CURRENT USE OF INSULIN (HCC): Primary | ICD-10-CM

## 2021-11-30 NOTE — TELEPHONE ENCOUNTER
----- Message from Desmond Weiner sent at 11/30/2021 12:49 PM EST -----  Subject: Appointment Request    Reason for Call: Routine Physical Exam    QUESTIONS  Type of Appointment? Established Patient  Reason for appointment request? Available appointments did not meet   patient need  Additional Information for Provider? Patient would like to reschedule her   appointment from 12/14 because she is starting chemo and would like to   come in before that starts to talk to doctor. She wants something sooner   than 12/13. Could the doctor fit her in? Please reach out to patient.  ---------------------------------------------------------------------------  --------------  CALL BACK INFO  What is the best way for the office to contact you? OK to leave message on   voicemail  Preferred Call Back Phone Number? [phone]  ---------------------------------------------------------------------------  --------------  SCRIPT ANSWERS  Relationship to Patient? Self  Have your symptoms changed? No  (If the patient has Medicare as their primary insurance coverage ask this   question) Are you requesting a Medicare Annual Wellness Visit? No  (Is the patient requesting a pap smear with their physical exam?)? No  (Is the patient requesting their annual physical and does not need PAP or   AWV per above?)? Yes   Have you been diagnosed with, awaiting test results for, or told that you   are suspected of having COVID-19 (Coronavirus)? (If patient has tested   negative or was tested as a requirement for work, school, or travel and   not based on symptoms, answer no)? No  Within the past two weeks have you developed any of the following symptoms   (answer no if symptoms have been present longer than 2 weeks or began   more than 2 weeks ago)?  Fever or Chills, Cough, Shortness of breath or   difficulty breathing, Loss of taste or smell, Sore throat, Nasal   congestion, Sneezing or runny nose, Fatigue or generalized body aches   (answer no if pain is specific to a body part e.g. back pain), Diarrhea,   Headache? No  Have you had close contact with someone with COVID-19 in the last 14 days? No  (Service Expert  click yes below to proceed with [employer] As Usual   Scheduling)?  Yes

## 2021-11-30 NOTE — TELEPHONE ENCOUNTER
----- Message from Sudhir Hurtado sent at 11/30/2021 12:49 PM EST -----  Subject: Appointment Request    Reason for Call: Routine Physical Exam    QUESTIONS  Type of Appointment? Established Patient  Reason for appointment request? Available appointments did not meet   patient need  Additional Information for Provider? Patient would like to reschedule her   appointment from 12/14 because she is starting chemo and would like to   come in before that starts to talk to doctor. She wants something sooner   than 12/13. Could the doctor fit her in? Please reach out to patient.  ---------------------------------------------------------------------------  --------------  CALL BACK INFO  What is the best way for the office to contact you? OK to leave message on   voicemail  Preferred Call Back Phone Number? [phone]  ---------------------------------------------------------------------------  --------------  SCRIPT ANSWERS  Relationship to Patient? Self  Have your symptoms changed? No  (If the patient has Medicare as their primary insurance coverage ask this   question) Are you requesting a Medicare Annual Wellness Visit? No  (Is the patient requesting a pap smear with their physical exam?)? No  (Is the patient requesting their annual physical and does not need PAP or   AWV per above?)? Yes   Have you been diagnosed with, awaiting test results for, or told that you   are suspected of having COVID-19 (Coronavirus)? (If patient has tested   negative or was tested as a requirement for work, school, or travel and   not based on symptoms, answer no)? No  Within the past two weeks have you developed any of the following symptoms   (answer no if symptoms have been present longer than 2 weeks or began   more than 2 weeks ago)?  Fever or Chills, Cough, Shortness of breath or   difficulty breathing, Loss of taste or smell, Sore throat, Nasal   congestion, Sneezing or runny nose, Fatigue or generalized body aches   (answer no if pain is specific to a body part e.g. back pain), Diarrhea,   Headache? No  Have you had close contact with someone with COVID-19 in the last 14 days? No  (Service Expert  click yes below to proceed with [employer] As Usual   Scheduling)?  Yes

## 2021-12-05 DIAGNOSIS — E11.9 CONTROLLED TYPE 2 DIABETES MELLITUS WITHOUT COMPLICATION, WITHOUT LONG-TERM CURRENT USE OF INSULIN (HCC): ICD-10-CM

## 2021-12-05 RX ORDER — METFORMIN HYDROCHLORIDE 850 MG/1
TABLET ORAL
Qty: 180 TABLET | Refills: 1 | Status: SHIPPED | OUTPATIENT
Start: 2021-12-05 | End: 2022-02-22 | Stop reason: ALTCHOICE

## 2021-12-07 ENCOUNTER — OFFICE VISIT (OUTPATIENT)
Dept: PRIMARY CARE CLINIC | Age: 51
End: 2021-12-07
Payer: COMMERCIAL

## 2021-12-07 VITALS
TEMPERATURE: 97.5 F | SYSTOLIC BLOOD PRESSURE: 119 MMHG | HEIGHT: 61 IN | OXYGEN SATURATION: 99 % | BODY MASS INDEX: 26.24 KG/M2 | HEART RATE: 99 BPM | RESPIRATION RATE: 20 BRPM | DIASTOLIC BLOOD PRESSURE: 81 MMHG | WEIGHT: 139 LBS

## 2021-12-07 DIAGNOSIS — C57.02 CARCINOMA OF LEFT FALLOPIAN TUBE (HCC): ICD-10-CM

## 2021-12-07 DIAGNOSIS — Z23 NEED FOR VACCINATION AGAINST STREPTOCOCCUS PNEUMONIAE: ICD-10-CM

## 2021-12-07 DIAGNOSIS — Z00.00 PHYSICAL EXAM: Primary | ICD-10-CM

## 2021-12-07 DIAGNOSIS — Z79.4 CONTROLLED TYPE 2 DIABETES MELLITUS WITHOUT COMPLICATION, WITH LONG-TERM CURRENT USE OF INSULIN (HCC): ICD-10-CM

## 2021-12-07 DIAGNOSIS — E11.9 CONTROLLED TYPE 2 DIABETES MELLITUS WITHOUT COMPLICATION, WITH LONG-TERM CURRENT USE OF INSULIN (HCC): ICD-10-CM

## 2021-12-07 PROBLEM — Z90.710 S/P HYSTERECTOMY WITH OOPHORECTOMY: Status: ACTIVE | Noted: 2021-12-07

## 2021-12-07 PROBLEM — Z90.721 S/P HYSTERECTOMY WITH OOPHORECTOMY: Status: ACTIVE | Noted: 2021-12-07

## 2021-12-07 PROCEDURE — 90471 IMMUNIZATION ADMIN: CPT | Performed by: INTERNAL MEDICINE

## 2021-12-07 PROCEDURE — 90732 PPSV23 VACC 2 YRS+ SUBQ/IM: CPT | Performed by: INTERNAL MEDICINE

## 2021-12-07 PROCEDURE — 99396 PREV VISIT EST AGE 40-64: CPT | Performed by: INTERNAL MEDICINE

## 2021-12-07 RX ORDER — INSULIN GLARGINE 100 [IU]/ML
INJECTION, SOLUTION SUBCUTANEOUS
Qty: 45 ML | Refills: 2 | Status: SHIPPED | OUTPATIENT
Start: 2021-12-07 | End: 2022-03-02

## 2021-12-07 NOTE — PROGRESS NOTES
Chief Complaint   Patient presents with    Physical       Health Maintenance Due   Topic    Pneumococcal 0-64 years (1 of 4 - PCV13)    Shingrix Vaccine Age 50> (1 of 2)    DTaP/Tdap/Td series (2 - Td or Tdap)    COVID-19 Vaccine (3 - Pfizer risk 4-dose series)        1. Have you been to the ER, urgent care clinic since your last visit? Hospitalized since your last visit? No    2. Have you seen or consulted any other health care providers outside of the [address] [place] [address] since your last visit? Include any pap smears or colon screening.  No    Visit Vitals  LMP 07/05/2013

## 2021-12-07 NOTE — PROGRESS NOTES
Patient provided with most updated VIS prior to administration. Opportunity given for questions and concerns provided. No concerns at this time    Immunizations administered to patient 12/7/2021 by Faina Anderson LPN with consent. Patient tolerated procedure well. No reactions noted.

## 2021-12-07 NOTE — PROGRESS NOTES
Soraya Gibson (: 1970) is a 46 y.o. female, established patient, here for evaluation of the following chief complaint(s):  Physical     Written by Alexis Rao, as dictated by Dr. Petar Teran MD.      ASSESSMENT/PLAN:  Below is the assessment and plan developed based on review of pertinent history, physical exam, labs, studies, and medications. 1. Physical exam  Complete physical done today. Routine lab work reviewed and discussed in the office today. 2. Carcinoma of left fallopian tube [facility])  She is starting chemotherapy on . Followed by Dr. Megan Evans, Oncology. 3. Controlled type 2 diabetes mellitus without complication, with long-term current use of insulin (HCC)  Well controlled on current medications. Continue taking metformin 850 mg BID and Basaglar insulin 14 units in the AM and 16 units in the PM as prescribed. -     insulin glargine (Basaglar KwikPen U-100 Insulin) 100 unit/mL (3 mL) inpn; Take  30 units daily, Normal, Disp-45 mL, R-2 sent to pharmacy. 4. Need for vaccination against Streptococcus pneumoniae  Administered a Pneumococcal-23 vaccine in the office today. She tolerated the vaccine well. -     PNEUMOCOCCAL POLYSACCHARIDE VACCINE, 23-VALENT, ADULT OR IMMUNOSUPPRESSED PT DOSE,    SUBJECTIVE/OBJECTIVE:  HPI     Patient presents today for a compete physical exam. 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 She is s/p total hysterectomy in 2016. She is followed by Dr. Megan Evans, Oncology, and starts chemotherapy on . She completed a mammogram this year and results were unremarkable. Her Colonoscopy and endoscopy were unremarkable as well. She takes metformin 850 mg BID and Basaglar insulin 14 units in the AM and 16 units in the PM for diabetes. Her most recent labs from 12/3 revealed elevated A1c (6.2%).      Patient Active Problem List   Diagnosis Code    AR (allergic rhinitis) J30.9    Hypercholesterolemia E78.00    S/P laparotomy Z98.890    Carcinomatosis ([address] C80.0    Fallopian tube cancer, carcinoma ([address] C57.00    S/P hysterectomy with oophorectomy Z90.710, Z90.721        Current Outpatient Medications on File Prior to Visit   Medication Sig Dispense Refill    metFORMIN (GLUCOPHAGE) 850 mg tablet TAKE 1 TAB BY MOUTH TWO (2) TIMES DAILY (WITH MEALS) FOR 90  Tablet 1    folic acid (FOLVITE) 1 mg tablet Take  by mouth daily.  cholecalciferol (Vitamin D3) (1000 Units /25 mcg) tablet Take  by mouth daily.  Insulin Needles, Disposable, (BD Kelly 2nd Gen Pen Needle) 32 gauge x 5/32\" ndle USE 2 NEEDLES  DAILY TO ADMINISTER 8 UNITS OF LANTUS SUBCUTANEOUSLY 300 Pen Needle 6    nortriptyline (PAMELOR) 25 mg capsule Take 25 mg by mouth nightly.  gabapentin (NEURONTIN) 600 mg tablet Take 600 mg by mouth daily.  magnesium oxide 250 mg magnesium tablet Take 250 mg by mouth daily.  melatonin 5 mg tablet Take 5 mg by mouth nightly.  ondansetron (ZOFRAN ODT) 8 mg disintegrating tablet       Blood-Glucose Meter monitoring kit Check blood sugar 3 times a day. 1 Kit 0    glucose blood VI test strips (ASCENSIA AUTODISC VI, ONE TOUCH ULTRA TEST VI) strip Glucose one touch test strips 100 Strip 11    b complex vitamins (B COMPLEX 1) tablet Take 1 Tab by mouth daily.  Lancets misc Use as directed. 1 Each 11    Lynparza 150 mg tablet       Lynparza 100 mg tab tablet       [DISCONTINUED] insulin glargine (Basaglar KwikPen U-100 Insulin) 100 unit/mL (3 mL) inpn Inject subcutaneous 14 units in am and 16 units in the pm. This is a total of 30 units daily 45 mL 2    [DISCONTINUED] insulin glargine (Basaglar KwikPen U-100 Insulin) 100 unit/mL (3 mL) inpn INJECT 20 UNITS SUBCUTANEOUSLY ONCE DAILY 45 mL 1    glucose blood VI test strips (Accu-Chek Mirian Plus test strp) strip Use four times a day and PRN.  300 Strip 2    glucose blood VI test strips (ACCU-CHEK ACTIVE TEST) strip Use QID and  Strip 5    liposomal DOXOrubicin, DOXIL/LIPODOX, (DOXIL) 2 mg/mL injection by IntraVENous route once. (Patient not taking: Reported on 2021)      cetirizine (ZYRTEC) 10 mg tablet Take  by mouth. (Patient not taking: Reported on 2021)     [address] Cranial Prosthesis  Diagnosis code  Cpt code 1 Each 2    lidocaine-prilocaine (EMLA) topical cream Apply small amount over port area and cover with band aid 1 hour before chemo (Patient not taking: Reported on 2021) 30 g 2     No current facility-administered medications on file prior to visit. Allergies   Allergen Reactions    Diphenhydramine-Acetaminophen Rash    Tylenol [Acetaminophen] Swelling     Tylenol pm only  SLURRED SPEECH. Past Medical History:   Diagnosis Date    AR (allergic rhinitis) 2010    Asthma 2010    ALLERGY RELATED; NONE USED SINCE     Cancer [facility])     OVARIAN AND FALLOPIAN TUBE    Cancer [facility] [address] 2019    ovarian cancer matasticized to bladder.      DM mellitus, gestational 2010    PRE DIABETIC    Elevated TSH 2010    Family history of diabetes mellitus (DM)     History of ovarian cancer 2016    Hypercholesterolemia 2011    elevated particle number    Ill-defined condition 16    HYDRONEPHROSIS, LEFT RENAL MASS (BEING WORKED UP BY DR MC Marx, UROL.)    Impaired glucose tolerance 2011    Menopause 2016    Other activity     BCG vaccine as a child    S/P laparotomy 2016    Uterine fibroid 2010    ENDOMETRIOSIS    Vitamin D deficiency 2010       Past Surgical History:   Procedure Laterality Date    HX APPENDECTOMY      HX  SECTION  ,     HX GYN  16    hysterectomy, TUBES REMOVED  ([facility])    HX HYSTERECTOMY  2016    HX OOPHORECTOMY Bilateral 2016    HX OTHER SURGICAL      small bowel obstruction, ILEUS POSTOP    HX VASCULAR ACCESS      PORT A CATH L CHEST WALL    HX WISDOM TEETH EXTRACTION      age 28    TX ABDOMEN SURGERY PROC UNLISTED  05/03/2016    EXP LAP FOR BOWEL OBST. Family History   Problem Relation Age of Onset    Diabetes Mother     Diabetes Father     Hypertension Father     Diabetes Maternal Grandmother     Diabetes Maternal Grandfather     No Known Problems Sister     Diabetes Paternal Uncle     Anesth Problems Neg Hx        Social History     Socioeconomic History    Marital status:      Spouse name: Not on file    Number of children: Not on file    Years of education: Not on file    Highest education level: Not on file   Occupational History    Not on file   Tobacco Use    Smoking status: Never Smoker    Smokeless tobacco: Never Used   Vaping Use    Vaping Use: Never used   Substance and Sexual Activity    Alcohol use: No    Drug use: No    Sexual activity: Yes     Partners: Male   Other Topics Concern    Not on file   Social History Narrative    Not on file     Social Determinants of Health     Financial Resource Strain:     Difficulty of Paying Living Expenses: Not on file   Food Insecurity:     Worried About Running Out of Food in the Last Year: Not on file    Jayda of Food in the Last Year: Not on file   Transportation Needs:     Lack of Transportation (Medical): Not on file    Lack of Transportation (Non-Medical):  Not on file   Physical Activity:     Days of Exercise per Week: Not on file    Minutes of Exercise per Session: Not on file   Stress:     Feeling of Stress : Not on file   Social Connections:     Frequency of Communication with Friends and Family: Not on file    Frequency of Social Gatherings with Friends and Family: Not on file    Attends Latter-day Services: Not on file    Active Member of Clubs or Organizations: Not on file    Attends Club or Organization Meetings: Not on file    Marital Status: Not on file   Intimate Partner Violence:     Fear of Current or Ex-Partner: Not on file    Emotionally Abused: Not on file    Physically Abused: Not on file    Sexually Abused: Not on file   Housing Stability:     Unable to Pay for Housing in the Last Year: Not on file    Number of Places Lived in the Last Year: Not on file    Unstable Housing in the Last Year: Not on file       Orders Only on 12/03/2021   Component Date Value Ref Range Status    Microalbumin,urine random 12/03/2021 1.27  MG/DL Final    No reference range has been established.  Creatinine, urine 12/03/2021 142.00  mg/dL Final    No reference range has been established.  Microalbumin/Creat ratio (mg/g cre* 12/03/2021 9  0 - 30 mg/g Final    Hemoglobin A1c 12/03/2021 6.2* 4.0 - 5.6 % Final    Comment: NEW METHOD PLEASE NOTE NEW REFERENCE RANGE  (NOTE)  HbA1C Interpretive Ranges  <5.7              Normal  5.7 - 6.4         Consider Prediabetes  >6.5              Consider Diabetes      Est. average glucose 12/03/2021 131  mg/dL Final    Sodium 12/03/2021 141  136 - 145 mmol/L Final    Potassium 12/03/2021 3.9  3.5 - 5.1 mmol/L Final    Chloride 12/03/2021 105  97 - 108 mmol/L Final    CO2 12/03/2021 30  21 - 32 mmol/L Final    Anion gap 12/03/2021 6  5 - 15 mmol/L Final    Glucose 12/03/2021 99  65 - 100 mg/dL Final    BUN 12/03/2021 12  6 - 20 MG/DL Final    Creatinine 12/03/2021 0.61  0.55 - 1.02 MG/DL Final    BUN/Creatinine ratio 12/03/2021 20  12 - 20   Final    GFR est AA 12/03/2021 >60  >60 ml/min/1.73m2 Final    GFR est non-AA 12/03/2021 >60  >60 ml/min/1.73m2 Final    Comment: Estimated GFR is calculated using the IDMS-traceable Modification of Diet in  Renal Disease (MDRD) Study equation, reported for both  Americans  (GFRAA) and non- Americans (GFRNA), and normalized to 1.73m2 body  surface area. The physician must decide which value applies to the patient.       Calcium 12/03/2021 9.0  8.5 - 10.1 MG/DL Final    Bilirubin, total 12/03/2021 0.4  0.2 - 1.0 MG/DL Final    ALT (SGPT) 12/03/2021 66  12 - 78 U/L Final    AST (SGOT) 12/03/2021 36  15 - 37 U/L Final    Alk. phosphatase 12/03/2021 65  45 - 117 U/L Final    Protein, total 12/03/2021 8.0  6.4 - 8.2 g/dL Final    Albumin 12/03/2021 3.9  3.5 - 5.0 g/dL Final    Globulin 12/03/2021 4.1* 2.0 - 4.0 g/dL Final    A-G Ratio 12/03/2021 1.0* 1.1 - 2.2   Final       Review of Systems   Constitutional: Negative for activity change, fatigue and unexpected weight change. HENT: Negative for congestion, hearing loss, rhinorrhea and sore throat. Eyes: Negative for discharge. Respiratory: Negative for cough, chest tightness and shortness of breath. Cardiovascular: Negative for leg swelling. Gastrointestinal: Negative for abdominal pain, constipation and diarrhea. Genitourinary: Negative for dysuria, flank pain, frequency and urgency. Musculoskeletal: Negative for arthralgias, back pain and myalgias. Skin: Negative for color change and rash. Neurological: Negative for dizziness, light-headedness and headaches. Psychiatric/Behavioral: Negative for dysphoric mood and sleep disturbance. The patient is not nervous/anxious. Visit Vitals  /81 (BP 1 Location: Right arm, BP Patient Position: Sitting, BP Cuff Size: Adult)   Pulse 99   Temp 97.5 °F (36.4 °C) (Temporal)   Resp 20   Ht 5' 1\" (1.549 m)   Wt 139 lb (63 kg)   LMP 07/05/2013   SpO2 99%   BMI 26.26 kg/m²       Physical Exam  Vitals and nursing note reviewed. Constitutional:       General: She is not in acute distress. Appearance: Normal appearance. She is normal weight. She is not diaphoretic. HENT:      Right Ear: Tympanic membrane, ear canal and external ear normal.      Left Ear: Tympanic membrane, ear canal and external ear normal.      Mouth/Throat:      Mouth: Mucous membranes are moist.      Pharynx: Oropharynx is clear. Eyes:      General:         Right eye: No discharge. Left eye: No discharge.       Extraocular Movements: Extraocular movements intact. Conjunctiva/sclera: Conjunctivae normal.   Neck:      Thyroid: No thyromegaly. Cardiovascular:      Rate and Rhythm: Normal rate and regular rhythm. Pulses: Normal pulses. Dorsalis pedis pulses are 2+ on the right side and 2+ on the left side. Pulmonary:      Effort: Pulmonary effort is normal.      Breath sounds: Normal breath sounds. No wheezing. Abdominal:      General: Bowel sounds are normal. There is no distension. Palpations: Abdomen is soft. Tenderness: There is no abdominal tenderness. Musculoskeletal:      Right lower leg: No edema. Left lower leg: No edema. Comments: B/L knees without crepitus   Lymphadenopathy:      Cervical: No cervical adenopathy. Neurological:      Deep Tendon Reflexes:      Reflex Scores:       Patellar reflexes are 2+ on the right side and 2+ on the left side. Psychiatric:         Mood and Affect: Mood and affect normal.             An electronic signature was used to authenticate this note.   -- Justino Fuchs

## 2022-02-22 ENCOUNTER — OFFICE VISIT (OUTPATIENT)
Dept: PRIMARY CARE CLINIC | Age: 52
End: 2022-02-22
Payer: COMMERCIAL

## 2022-02-22 VITALS
BODY MASS INDEX: 26.43 KG/M2 | SYSTOLIC BLOOD PRESSURE: 134 MMHG | HEART RATE: 110 BPM | HEIGHT: 61 IN | DIASTOLIC BLOOD PRESSURE: 84 MMHG | OXYGEN SATURATION: 96 % | WEIGHT: 140 LBS | RESPIRATION RATE: 15 BRPM | TEMPERATURE: 97.3 F

## 2022-02-22 DIAGNOSIS — D69.6 THROMBOCYTOPENIA (HCC): Primary | ICD-10-CM

## 2022-02-22 DIAGNOSIS — C80.0 CARCINOMATOSIS (HCC): ICD-10-CM

## 2022-02-22 DIAGNOSIS — G62.0 PERIPHERAL NEUROPATHY DUE TO CHEMOTHERAPY (HCC): ICD-10-CM

## 2022-02-22 DIAGNOSIS — C57.01: ICD-10-CM

## 2022-02-22 DIAGNOSIS — D70.1 CHEMOTHERAPY-INDUCED NEUTROPENIA (HCC): ICD-10-CM

## 2022-02-22 DIAGNOSIS — E11.9 CONTROLLED TYPE 2 DIABETES MELLITUS WITHOUT COMPLICATION, WITH LONG-TERM CURRENT USE OF INSULIN (HCC): ICD-10-CM

## 2022-02-22 DIAGNOSIS — T45.1X5A PERIPHERAL NEUROPATHY DUE TO CHEMOTHERAPY (HCC): ICD-10-CM

## 2022-02-22 DIAGNOSIS — T45.1X5A CHEMOTHERAPY-INDUCED NEUTROPENIA (HCC): ICD-10-CM

## 2022-02-22 DIAGNOSIS — Z79.4 CONTROLLED TYPE 2 DIABETES MELLITUS WITHOUT COMPLICATION, WITH LONG-TERM CURRENT USE OF INSULIN (HCC): ICD-10-CM

## 2022-02-22 PROCEDURE — 99214 OFFICE O/P EST MOD 30 MIN: CPT | Performed by: INTERNAL MEDICINE

## 2022-02-22 RX ORDER — FAMOTIDINE 20 MG/1
TABLET, FILM COATED ORAL
COMMUNITY
Start: 2020-12-22

## 2022-02-22 RX ORDER — APREPITANT 80 MG/1
80 CAPSULE ORAL DAILY
COMMUNITY

## 2022-02-22 RX ORDER — OLANZAPINE 10 MG/1
TABLET ORAL
COMMUNITY
Start: 2021-12-09 | End: 2022-02-22 | Stop reason: ALTCHOICE

## 2022-02-22 RX ORDER — DEXAMETHASONE 4 MG/1
TABLET ORAL
COMMUNITY
Start: 2022-02-18 | End: 2022-04-25 | Stop reason: ALTCHOICE

## 2022-02-22 RX ORDER — REPAGLINIDE 1 MG/1
1 TABLET ORAL
Qty: 90 TABLET | Refills: 0 | Status: SHIPPED | OUTPATIENT
Start: 2022-02-22 | End: 2022-03-20

## 2022-02-22 NOTE — PROGRESS NOTES
Soraya Gibson (: 1970) is a 46 y.o. female, established patient, here for evaluation of the following chief complaint(s):  Transitions Of Care (platelets were low fever present)     Written by Luli Alfredo, as dictated by Dr. Leni Schneider MD.      ASSESSMENT/PLAN:  Below is the assessment and plan developed based on review of pertinent history, physical exam, labs, studies, and medications. 1. Thrombocytopenia ([address]  Stable at this time. She will have repeat blood work done with Oncology next week. 2. Chemotherapy-induced neutropenia ([address]  She is getting Neulesta. 3. Carcinomatosis ([address]  She is going through 6 rounds of chemotherapy. 4. Peripheral neuropathy due to chemotherapy (HCC)  Continue on gabapentin and nortriptyline. 5. Controlled type 2 diabetes mellitus without complication, with long-term current use of insulin (HCC)  Because of the high lactic acid at the hospital and recent imaging with contrast we will stop metformin. We will start her on Prandin 1 mg before meals. Potential side effects were discussed. Continue on Basaglar insulin BID. I asked her to check her BG 30 min after lunch to see how the Prandin is working.   -     repaglinide (PRANDIN) 1 mg tablet; Take 1 Tablet by mouth Before breakfast, lunch, and dinner for 30 days. , Normal, Disp-90 Tablet, R-0 sent to pharmacy. 6. Malignant tumor of fallopian tube, right ([address]  Followed by Dr. Oswaldo Farias (gynecologic oncology). She is going through chemo. SUBJECTIVE/OBJECTIVE:  HPI   The patient presents today for a hospital follow-up. She was hospitalized at [facility] from 02/15/22-22 for neutropenic fever. She originally went to the hospital due to fever and chills after chemotherapy. She was treated with cefepime. Her lactate was 3.3 on admission, but normalized. She has carcinomatosis and is followed by Dr. Oswaldo Farias (gynecoligic oncology) at [facility]. She is currently being treated with chemotherapy.  She has nausea and fatigue due to chemotherapy and is on Emend 80 mg for nausea which helps. She will have a repeat CT scan after her 4th chemo treatment in March. She is on gabapentin and nortriptyline for neuropathy. She is currently taking Basaglar 32 units BID for diabetes. Her metformin was stopped at the hospital. She is on Decadron for 3 days following chemotherapy which increases her BG. She checks her BG regularly. Patient Active Problem List   Diagnosis Code    AR (allergic rhinitis) J30.9    Hypercholesterolemia E78.00    S/P laparotomy Z98.890    Carcinomatosis ([address] C80.0    S/P hysterectomy with oophorectomy Z90.710, Z90.721    Malignant tumor of fallopian tube, right (HCC) C57.01        Current Outpatient Medications on File Prior to Visit   Medication Sig Dispense Refill    dexAMETHasone (DECADRON) 4 mg tablet TAKE 2 TABLETS BY MOUTH DAILY FOR 3 DAYS FOLLOWING CHEMO      famotidine (PEPCID) 20 mg tablet 20 mg = 1 tab each dose, PO, bid, # 60 tab, 3 Refills, Pharmacy: [facility]/pharmacy #[id number]     aprepitant (EMEND) 80 mg capsule Take 80 mg by mouth daily.  insulin glargine (Basaglar KwikPen U-100 Insulin) 100 unit/mL (3 mL) inpn Take  30 units daily 45 mL 2    folic acid (FOLVITE) 1 mg tablet Take  by mouth daily.  cholecalciferol (Vitamin D3) (1000 Units /25 mcg) tablet Take  by mouth daily.  Insulin Needles, Disposable, (BD Kelly 2nd Gen Pen Needle) 32 gauge x 5/32\" ndle USE 2 NEEDLES  DAILY TO ADMINISTER 8 UNITS OF LANTUS SUBCUTANEOUSLY 300 Pen Needle 6    nortriptyline (PAMELOR) 25 mg capsule Take 25 mg by mouth nightly.  gabapentin (NEURONTIN) 600 mg tablet Take 600 mg by mouth daily.  magnesium oxide 250 mg magnesium tablet Take 250 mg by mouth daily.  melatonin 5 mg tablet Take 5 mg by mouth nightly.  ondansetron (ZOFRAN ODT) 8 mg disintegrating tablet       Blood-Glucose Meter monitoring kit Check blood sugar 3 times a day.  1 Kit 0    liposomal DOXOrubicin, DOXIL/LIPODOX, (DOXIL) 2 mg/mL injection by IntraVENous route once.  glucose blood VI test strips (ASCENSIA AUTODISC VI, ONE TOUCH ULTRA TEST VI) strip Glucose one touch test strips 100 Strip 11    b complex vitamins (B COMPLEX 1) tablet Take 1 Tab by mouth daily.  Lancets misc Use as directed. 1 Each 11    [DISCONTINUED] OLANZapine (ZyPREXA) 10 mg tablet TAKE 1 TABLET BY MOUTH EVERY DAY ON DAY 2,3,AND 4 OF EACH CYCLE TO PREVENT DELAYED NAUSEA & VOMITING      [DISCONTINUED] metFORMIN (GLUCOPHAGE) 850 mg tablet TAKE 1 TAB BY MOUTH TWO (2) TIMES DAILY (WITH MEALS) FOR 90  Tablet 1    Lynparza 150 mg tablet       glucose blood VI test strips (Accu-Chek Mirian Plus test strp) strip Use four times a day and PRN. 300 Strip 2    glucose blood VI test strips (ACCU-CHEK ACTIVE TEST) strip Use QID and  Strip 5    cetirizine (ZYRTEC) 10 mg tablet Take  by mouth. (Patient not taking: Reported on 7/19/2021)     [address] Cranial Prosthesis  Diagnosis code  Cpt code 1 Each 2    lidocaine-prilocaine (EMLA) topical cream Apply small amount over port area and cover with band aid 1 hour before chemo (Patient not taking: Reported on 7/19/2021) 30 g 2     No current facility-administered medications on file prior to visit. Allergies   Allergen Reactions    Diphenhydramine-Acetaminophen Rash    Tylenol [Acetaminophen] Swelling     Tylenol pm only  SLURRED SPEECH. 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      DM mellitus, gestational 4/21/2010    PRE DIABETIC    Elevated TSH 4/21/2010    Family history of diabetes mellitus (DM)     History of ovarian cancer 06/2016    Hypercholesterolemia 11/2011    elevated particle number    Ill-defined condition 16    HYDRONEPHROSIS, LEFT RENAL MASS (BEING WORKED UP BY DR MC Arias, UROL.)    Impaired glucose tolerance 2011    Menopause 2016    Other activity     BCG vaccine as a child    S/P laparotomy 2016    Uterine fibroid 2010    ENDOMETRIOSIS    Vitamin D deficiency 2010       Past Surgical History:   Procedure Laterality Date    HX APPENDECTOMY      HX  SECTION  ,     HX GYN  16    hysterectomy, TUBES REMOVED  ([facility])    HX HYSTERECTOMY  2016    HX OOPHORECTOMY Bilateral 2016    HX OTHER SURGICAL      small bowel obstruction, ILEUS POSTOP    HX VASCULAR ACCESS      PORT A CATH L CHEST WALL    HX WISDOM TEETH EXTRACTION      age 28    IN ABDOMEN SURGERY PROC UNLISTED  2016    EXP LAP FOR BOWEL OBST. Family History   Problem Relation Age of Onset    Diabetes Mother     Diabetes Father     Hypertension Father     Diabetes Maternal Grandmother     Diabetes Maternal Grandfather     No Known Problems Sister     Diabetes Paternal Uncle     Anesth Problems Neg Hx        Social History     Socioeconomic History    Marital status:      Spouse name: Not on file    Number of children: Not on file    Years of education: Not on file    Highest education level: Not on file   Occupational History    Not on file   Tobacco Use    Smoking status: Never Smoker    Smokeless tobacco: Never Used   Vaping Use    Vaping Use: Never used   Substance and Sexual Activity    Alcohol use: No    Drug use: No    Sexual activity: Yes     Partners: Male   Other Topics Concern    Not on file   Social History Narrative    Not on file       Orders Only on 2021   Component Date Value Ref Range Status    Microalbumin,urine random 2021 1.27  MG/DL Final    No reference range has been established.  Creatinine, urine 2021 142.00  mg/dL Final    No reference range has been established.     Microalbumin/Creat ratio (mg/g cre* 12/03/2021 9  0 - 30 mg/g Final    Hemoglobin A1c 12/03/2021 6.2* 4.0 - 5.6 % Final    Comment: NEW METHOD PLEASE NOTE NEW REFERENCE RANGE  (NOTE)  HbA1C Interpretive Ranges  <5.7              Normal  5.7 - 6.4         Consider Prediabetes  >6.5              Consider Diabetes      Est. average glucose 12/03/2021 131  mg/dL Final    Sodium 12/03/2021 141  136 - 145 mmol/L Final    Potassium 12/03/2021 3.9  3.5 - 5.1 mmol/L Final    Chloride 12/03/2021 105  97 - 108 mmol/L Final    CO2 12/03/2021 30  21 - 32 mmol/L Final    Anion gap 12/03/2021 6  5 - 15 mmol/L Final    Glucose 12/03/2021 99  65 - 100 mg/dL Final    BUN 12/03/2021 12  6 - 20 MG/DL Final    Creatinine 12/03/2021 0.61  0.55 - 1.02 MG/DL Final    BUN/Creatinine ratio 12/03/2021 20  12 - 20   Final    GFR est AA 12/03/2021 >60  >60 ml/min/1.73m2 Final    GFR est non-AA 12/03/2021 >60  >60 ml/min/1.73m2 Final    Comment: Estimated GFR is calculated using the IDMS-traceable Modification of Diet in  Renal Disease (MDRD) Study equation, reported for both  Americans  (GFRAA) and non- Americans (GFRNA), and normalized to 1.73m2 body  surface area. The physician must decide which value applies to the patient.  Calcium 12/03/2021 9.0  8.5 - 10.1 MG/DL Final    Bilirubin, total 12/03/2021 0.4  0.2 - 1.0 MG/DL Final    ALT (SGPT) 12/03/2021 66  12 - 78 U/L Final    AST (SGOT) 12/03/2021 36  15 - 37 U/L Final    Alk. phosphatase 12/03/2021 65  45 - 117 U/L Final    Protein, total 12/03/2021 8.0  6.4 - 8.2 g/dL Final    Albumin 12/03/2021 3.9  3.5 - 5.0 g/dL Final    Globulin 12/03/2021 4.1* 2.0 - 4.0 g/dL Final    A-G Ratio 12/03/2021 1.0* 1.1 - 2.2   Final      Review of Systems   Constitutional: Negative for activity change, fatigue and unexpected weight change. HENT: Negative for congestion, hearing loss, rhinorrhea and sore throat. Eyes: Negative for discharge.    Respiratory: Negative for cough, chest tightness and shortness of breath. Cardiovascular: Negative for leg swelling. Gastrointestinal: Negative for abdominal pain, constipation and diarrhea. Genitourinary: Negative for dysuria, flank pain, frequency and urgency. Musculoskeletal: Negative for arthralgias, back pain and myalgias. Skin: Negative for color change and rash. Neurological: Negative for dizziness, light-headedness and headaches. Psychiatric/Behavioral: Negative for dysphoric mood and sleep disturbance. The patient is not nervous/anxious. Visit Vitals  /84 (BP 1 Location: Left arm)   Pulse (!) 110   Temp 97.3 °F (36.3 °C)   Resp 15   Ht 5' 1\" (1.549 m)   Wt 140 lb (63.5 kg)   LMP 07/05/2013   SpO2 96%   BMI 26.45 kg/m²      Physical Exam  Vitals and nursing note reviewed. Constitutional:       General: She is not in acute distress. Appearance: Normal appearance. She is well-developed. She is not diaphoretic. HENT:      Right Ear: External ear normal.      Left Ear: External ear normal.   Eyes:      General: No scleral icterus. Right eye: No discharge. Left eye: No discharge. Extraocular Movements: Extraocular movements intact. Conjunctiva/sclera: Conjunctivae normal.   Cardiovascular:      Rate and Rhythm: Normal rate and regular rhythm. Pulmonary:      Effort: Pulmonary effort is normal.      Breath sounds: Normal breath sounds. No wheezing. Abdominal:      General: Bowel sounds are normal.      Palpations: Abdomen is soft. Tenderness: There is no abdominal tenderness. Musculoskeletal:      Cervical back: Normal range of motion and neck supple. Lymphadenopathy:      Cervical: No cervical adenopathy. Neurological:      Mental Status: She is alert and oriented to person, place, and time. Psychiatric:         Mood and Affect: Mood and affect normal.         An electronic signature was used to authenticate this note.   -- [facility]

## 2022-02-22 NOTE — PROGRESS NOTES
Chief Complaint   Patient presents with    Transitions Of Care     platelets were low fever present       Visit Vitals  /84 (BP 1 Location: Left arm)   Pulse (!) 110   Temp 97.3 °F (36.3 °C)   Resp 15   Ht 5' 1\" (1.549 m)   Wt 140 lb (63.5 kg)   LMP 07/05/2013   SpO2 96%   BMI 26.45 kg/m²       1. Have you been to the ER, urgent care clinic since your last visit? Hospitalized since your last visit? Yes [address]    2. Have you seen or consulted any other health care providers outside of the [address] [place] [address] since your last visit? Include any pap smears or colon screening.  Yes [facility] Oncologist, Dentist

## 2022-03-02 DIAGNOSIS — Z79.4 TYPE 2 DIABETES MELLITUS WITH HYPERGLYCEMIA, WITH LONG-TERM CURRENT USE OF INSULIN (HCC): Primary | ICD-10-CM

## 2022-03-02 DIAGNOSIS — E11.65 TYPE 2 DIABETES MELLITUS WITH HYPERGLYCEMIA, WITH LONG-TERM CURRENT USE OF INSULIN (HCC): Primary | ICD-10-CM

## 2022-03-02 RX ORDER — INSULIN ASPART 100 [IU]/ML
INJECTION, SOLUTION INTRAVENOUS; SUBCUTANEOUS
Qty: 3 ML | Refills: 1 | Status: SHIPPED | OUTPATIENT
Start: 2022-03-02 | End: 2022-04-25 | Stop reason: SDUPTHER

## 2022-03-03 DIAGNOSIS — E11.9 CONTROLLED TYPE 2 DIABETES MELLITUS WITHOUT COMPLICATION, WITHOUT LONG-TERM CURRENT USE OF INSULIN (HCC): ICD-10-CM

## 2022-03-04 RX ORDER — PEN NEEDLE, DIABETIC 31 GX3/16"
NEEDLE, DISPOSABLE MISCELLANEOUS
Qty: 300 PEN NEEDLE | Refills: 6 | OUTPATIENT
Start: 2022-03-04

## 2022-03-19 DIAGNOSIS — E11.9 CONTROLLED TYPE 2 DIABETES MELLITUS WITHOUT COMPLICATION, WITH LONG-TERM CURRENT USE OF INSULIN (HCC): ICD-10-CM

## 2022-03-19 DIAGNOSIS — Z79.4 CONTROLLED TYPE 2 DIABETES MELLITUS WITHOUT COMPLICATION, WITH LONG-TERM CURRENT USE OF INSULIN (HCC): ICD-10-CM

## 2022-03-19 PROBLEM — C57.01: Status: ACTIVE | Noted: 2022-02-22

## 2022-03-19 PROBLEM — Z90.721 S/P HYSTERECTOMY WITH OOPHORECTOMY: Status: ACTIVE | Noted: 2021-12-07

## 2022-03-19 PROBLEM — Z90.710 S/P HYSTERECTOMY WITH OOPHORECTOMY: Status: ACTIVE | Noted: 2021-12-07

## 2022-03-20 RX ORDER — REPAGLINIDE 1 MG/1
1 TABLET ORAL
Qty: 90 TABLET | Refills: 0 | Status: SHIPPED | OUTPATIENT
Start: 2022-03-20 | End: 2022-04-19

## 2022-03-30 ENCOUNTER — TELEPHONE (OUTPATIENT)
Dept: PRIMARY CARE CLINIC | Age: 52
End: 2022-03-30

## 2022-03-30 DIAGNOSIS — Z79.4 INSULIN DEPENDENT TYPE 2 DIABETES MELLITUS (HCC): Primary | ICD-10-CM

## 2022-03-30 DIAGNOSIS — E11.9 INSULIN DEPENDENT TYPE 2 DIABETES MELLITUS (HCC): Primary | ICD-10-CM

## 2022-03-30 RX ORDER — INSULIN DEGLUDEC 200 U/ML
INJECTION, SOLUTION SUBCUTANEOUS
Qty: 15 ML | Refills: 1 | Status: SHIPPED | OUTPATIENT
Start: 2022-03-30 | End: 2022-04-07 | Stop reason: SDUPTHER

## 2022-03-30 RX ORDER — PEN NEEDLE, DIABETIC 30 GX3/16"
NEEDLE, DISPOSABLE MISCELLANEOUS
Qty: 1 EACH | Refills: 11 | Status: SHIPPED | OUTPATIENT
Start: 2022-03-30 | End: 2022-04-07 | Stop reason: SDUPTHER

## 2022-03-30 RX ORDER — FLASH GLUCOSE SENSOR
KIT MISCELLANEOUS
Qty: 1 KIT | Refills: 2 | Status: SHIPPED | OUTPATIENT
Start: 2022-03-30 | End: 2022-04-25

## 2022-03-30 RX ORDER — FLASH GLUCOSE SCANNING READER
EACH MISCELLANEOUS
Qty: 1 EACH | Refills: 3 | Status: SHIPPED | OUTPATIENT
Start: 2022-03-30 | End: 2022-04-25

## 2022-03-30 NOTE — TELEPHONE ENCOUNTER
Patient asked if a generic insulin glaragine (Basaglar Kwikpen U-100 insuline) 100 unit/ml 3ml inpn   prescription can be sent to the pharmacy. Patient said she is paying 265.00 for 1 month.   Patient said she called her pharmacy and they gave her some names of generic brands:  Tresiba flex touch pen  Levemir flex touch  Patient said she would like a 90 day supply of  Touch pen  90 day supply of - Bd jean 2nd Gen Pen needle  90 day supply of Novolog  100 unit/ml 3ml inpn

## 2022-04-11 ENCOUNTER — TELEPHONE (OUTPATIENT)
Dept: PRIMARY CARE CLINIC | Age: 52
End: 2022-04-11

## 2022-04-11 NOTE — TELEPHONE ENCOUNTER
Per fax from [facility], Script clarification    Comments: clarify directions or type of needle ( pen needle or lancet)          Insulin Needles, Disposable, 31 gauge x 5/16\" ndle

## 2022-04-25 ENCOUNTER — OFFICE VISIT (OUTPATIENT)
Dept: PRIMARY CARE CLINIC | Age: 52
End: 2022-04-25
Payer: COMMERCIAL

## 2022-04-25 ENCOUNTER — TELEPHONE (OUTPATIENT)
Dept: PRIMARY CARE CLINIC | Age: 52
End: 2022-04-25

## 2022-04-25 VITALS
BODY MASS INDEX: 27.19 KG/M2 | OXYGEN SATURATION: 100 % | TEMPERATURE: 98 F | WEIGHT: 144 LBS | RESPIRATION RATE: 16 BRPM | DIASTOLIC BLOOD PRESSURE: 76 MMHG | HEIGHT: 61 IN | SYSTOLIC BLOOD PRESSURE: 118 MMHG | HEART RATE: 91 BPM

## 2022-04-25 DIAGNOSIS — Z79.4 CONTROLLED TYPE 2 DIABETES MELLITUS WITHOUT COMPLICATION, WITH LONG-TERM CURRENT USE OF INSULIN (HCC): Primary | ICD-10-CM

## 2022-04-25 DIAGNOSIS — Z79.4 TYPE 2 DIABETES MELLITUS WITH HYPERGLYCEMIA, WITH LONG-TERM CURRENT USE OF INSULIN (HCC): Primary | ICD-10-CM

## 2022-04-25 DIAGNOSIS — T50.B95A ADVERSE REACTION TO COVID-19 VACCINE: ICD-10-CM

## 2022-04-25 DIAGNOSIS — E11.65 TYPE 2 DIABETES MELLITUS WITH HYPERGLYCEMIA, WITH LONG-TERM CURRENT USE OF INSULIN (HCC): Primary | ICD-10-CM

## 2022-04-25 DIAGNOSIS — E11.9 CONTROLLED TYPE 2 DIABETES MELLITUS WITHOUT COMPLICATION, WITH LONG-TERM CURRENT USE OF INSULIN (HCC): Primary | ICD-10-CM

## 2022-04-25 DIAGNOSIS — C57.01: ICD-10-CM

## 2022-04-25 PROCEDURE — 99214 OFFICE O/P EST MOD 30 MIN: CPT | Performed by: INTERNAL MEDICINE

## 2022-04-25 RX ORDER — INSULIN DEGLUDEC 200 U/ML
INJECTION, SOLUTION SUBCUTANEOUS
Qty: 48 ML | Refills: 0 | Status: CANCELLED | OUTPATIENT
Start: 2022-04-25

## 2022-04-25 RX ORDER — PEN NEEDLE, DIABETIC 30 GX3/16"
NEEDLE, DISPOSABLE MISCELLANEOUS
Qty: 100 PEN NEEDLE | Refills: 11 | Status: SHIPPED | OUTPATIENT
Start: 2022-04-25

## 2022-04-25 RX ORDER — PROCHLORPERAZINE MALEATE 10 MG
5 TABLET ORAL
COMMUNITY

## 2022-04-25 RX ORDER — LANCETS
EACH MISCELLANEOUS
Qty: 1 EACH | Refills: 11 | Status: SHIPPED | OUTPATIENT
Start: 2022-04-25

## 2022-04-25 RX ORDER — INSULIN ASPART 100 [IU]/ML
INJECTION, SOLUTION INTRAVENOUS; SUBCUTANEOUS
Qty: 15 ML | Refills: 1 | Status: SHIPPED | OUTPATIENT
Start: 2022-04-25 | End: 2022-07-05 | Stop reason: SDUPTHER

## 2022-04-25 NOTE — TELEPHONE ENCOUNTER
Patient states here insurance doesn't cover Novolog insulin her insurance covers Aspart insulin instead. Patient needs before Thursday going out of town.

## 2022-04-25 NOTE — PROGRESS NOTES
Identified pt with two pt identifiers(name and ). Chief Complaint   Patient presents with    Diabetes    Medication Refill     wants 3 months supply as she is traveling.  Letter     would like letter stating that she has diabetes and is on insulin for travel. Pt traveling to [place]         3 most recent PHQ Screens 2022   Little interest or pleasure in doing things Not at all   Feeling down, depressed, irritable, or hopeless Not at all   Total Score PHQ 2 0        Vitals:    22 1209   BP: 118/76   Pulse: 91   Resp: 16   Temp: 98 °F (36.7 °C)   TempSrc: Oral   SpO2: 100%   Weight: 144 lb (65.3 kg)   Height: 5' 1\" (1.549 m)   LMP: 2013       Health Maintenance Due   Topic    Shingrix Vaccine Age 50> (1 of 2)    DTaP/Tdap/Td series (2 - Td or Tdap)       1. Have you been to the ER, urgent care clinic since your last visit? Hospitalized since your last visit? YES - ED [facility] - fever after booster shot     2. Have you seen or consulted any other health care providers outside of the [address] [place] [address] since your last visit? Include any pap smears or colon screening. No    3. For patients aged 39-70: Has the patient had a colonoscopy / FIT/ Cologuard? Yes - no Care Gap present      If the patient is female:    4. For patients aged 41-77: Has the patient had a mammogram within the past 2 years? Yes - no Care Gap present      5. For patients aged 21-65: Has the patient had a pap smear?  Yes - no Care Gap present

## 2022-04-25 NOTE — TELEPHONE ENCOUNTER
Spoke  with pharmacist- this medication will not be covered, will have to pay $1,100, pharmacist stated the only other alt that was given is a brand called [employer]

## 2022-04-25 NOTE — LETTER
4/25/2022 12:47 PM    Ms. Soraya Gibson  [address] [address]      To whom it may concern,    Ms. Rama Meneses is under the care of Satya Jones. She will be traveling to [facility]. She is on insulin for diabetes and we recommend she keep her insulin pen with her on her flight. Please contact our office with any questions or concerns.        Sincerely,      Antonieta Sher MD

## 2022-04-25 NOTE — PROGRESS NOTES
Soraya Gibson (: 1970) is a 46 y.o. female, established patient, here for evaluation of the following chief complaint(s):  Diabetes, Medication Refill (wants 3 months supply as she is traveling. ), and Letter (would like letter stating that she has diabetes and is on insulin for travel. Pt traveling to [facility] )     Written by Phillip Salinas, as dictated by Dr. Eden Sanchez MD.      ASSESSMENT/PLAN:  Below is the assessment and plan developed based on review of pertinent history, physical exam, labs, studies, and medications. 1. Type 2 diabetes mellitus with hyperglycemia, with long-term current use of insulin (HCC)  Continue on Novolog 10 units before lunch and dinner and Tresiba 80 units daily. Refilled Novolog.   -     insulin aspart U-100 (NOVOLOG) 100 unit/mL (3 mL) inpn; 10 units before Lunch and dinner., Normal, Disp-15 mL, R[place], needs 3 months supply sent to pharmacy.   -     Insulin Needles, Disposable, 31 gauge x 5/16\" ndle; Check blood sugar 5 times a day., Normal, Disp-100 Pen Needle, R-11 sent to pharmacy. -     lancets misc; Use as directed., Normal, Disp-1 Each, R-11 sent to pharmacy. 2. Malignant tumor of fallopian tube, right ([address]  Followed by Dr. Antonina Stokes (gynecologic oncology). Continue with chemo treatments. 3. Adverse reaction to COVID-19 vaccine  Reviewed ED notes. Her fever and chills have resolved. SUBJECTIVE/OBJECTIVE:  HPI   The patient presents today for a routine follow-up of chronic conditions. She will be traveling to [facility] soon for 40 days. She has cancer of her fallopian tube and is followed by Dr. Antonina Stokes (gynecologic oncology) at [facility]. She is currently being treated with chemotherapy. She will skip 1 chemo treatment while in [facility]. She is on Novolog 10 units before lunch and dinner and Tresiba 80 units daily for diabetes. Her BG readings have been fluctuating, but especially elevated after chemo.      She was seen at [facility] ED on 04/18/22 with fever and chills after having her COVID booster. Symptoms have since resolved. Patient Active Problem List   Diagnosis Code    AR (allergic rhinitis) J30.9    Hypercholesterolemia E78.00    S/P laparotomy Z98.890    Carcinomatosis ([facility] [address] C80.0    S/P hysterectomy with oophorectomy Z90.710, Z90.721    Malignant tumor of fallopian tube, right ([facility] [address] C57.01        Current Outpatient Medications on File Prior to Visit   Medication Sig Dispense Refill    prochlorperazine (Compazine) 10 mg tablet Take 5 mg by mouth every six (6) hours as needed for Nausea or Vomiting.  insulin degludec (TRESIBA) 200 unit/mL (3 mL) inpn pen 80 units daily 48 mL 0    famotidine (PEPCID) 20 mg tablet 20 mg = 1 tab each dose, PO, bid, # 60 tab, 3 Refills, Pharmacy: [facility]/pharmacy #[id number]     aprepitant (EMEND) 80 mg capsule Take 80 mg by mouth daily.  folic acid (FOLVITE) 1 mg tablet Take  by mouth daily.  cholecalciferol (Vitamin D3) (1000 Units /25 mcg) tablet Take  by mouth daily.  glucose blood VI test strips (Accu-Chek Mirian Plus test strp) strip Use four times a day and PRN. 300 Strip 2    nortriptyline (PAMELOR) 25 mg capsule Take 25 mg by mouth nightly.  gabapentin (NEURONTIN) 600 mg tablet Take 600 mg by mouth daily.  magnesium oxide 250 mg magnesium tablet Take 250 mg by mouth daily.  melatonin 5 mg tablet Take 5 mg by mouth nightly.  ondansetron (ZOFRAN ODT) 8 mg disintegrating tablet       Blood-Glucose Meter monitoring kit Check blood sugar 3 times a day. 1 Kit 0    glucose blood VI test strips (ASCENSIA AUTODISC VI, ONE TOUCH ULTRA TEST VI) strip Glucose one touch test strips 100 Strip 11    b complex vitamins (B COMPLEX 1) tablet Take 1 Tab by mouth daily.  [DISCONTINUED] Insulin Needles, Disposable, 31 gauge x 5/16\" ndle Check blood sugar 5 times a day.  100 Pen Needle 11    flash glucose scanning reader (FreeStyle Zach 14 Day Aurora) misc Check blood sugar 5 times a day. 1 Each 3    flash glucose sensor (FreeStyle Zach 14 Day Sensor) kit Check blood sugar 5 times a day 1 Kit 2    [DISCONTINUED] insulin aspart U-100 (NOVOLOG) 100 unit/mL (3 mL) inpn 6 units before Lunch and dinner. 3 mL 1    [DISCONTINUED] dexAMETHasone (DECADRON) 4 mg tablet TAKE 2 TABLETS BY MOUTH DAILY FOR 3 DAYS FOLLOWING CHEMO      [DISCONTINUED] Lynparza 150 mg tablet  (Patient not taking: Reported on 4/25/2022)      [DISCONTINUED] glucose blood VI test strips (ACCU-CHEK ACTIVE TEST) strip Use QID and  Strip 5    liposomal DOXOrubicin, DOXIL/LIPODOX, (DOXIL) 2 mg/mL injection by IntraVENous route once. (Patient not taking: Reported on 4/25/2022)      [DISCONTINUED] cetirizine (ZYRTEC) 10 mg tablet Take  by mouth. (Patient not taking: Reported on 7/19/2021)      [DISCONTINUED] Lancets misc Use as directed. 1 Each 11    CRANIAL PROSTHESIS misc Cranial Prosthesis  Diagnosis code  Cpt code 1 Each 2    lidocaine-prilocaine (EMLA) topical cream Apply small amount over port area and cover with band aid 1 hour before chemo (Patient not taking: Reported on 7/19/2021) 30 g 2     No current facility-administered medications on file prior to visit. Allergies   Allergen Reactions    Diphenhydramine-Acetaminophen Rash    Tylenol [Acetaminophen] Swelling     Tylenol pm only  SLURRED SPEECH. Past Medical History:   Diagnosis Date    AR (allergic rhinitis) 4/21/2010    Asthma 4/21/2010    ALLERGY RELATED; NONE USED SINCE 2010    Cancer 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      DM mellitus, gestational 4/21/2010    PRE DIABETIC    Elevated TSH 4/21/2010    Family history of diabetes mellitus (DM)     History of ovarian cancer 06/2016    Hypercholesterolemia 11/2011    elevated particle number    Ill-defined condition 6/16/16    HYDRONEPHROSIS, LEFT RENAL MASS (BEING WORKED UP BY DR MC Nowak.)  Impaired glucose tolerance 2011    Menopause 2016    Other activity     BCG vaccine as a child    S/P laparotomy 2016    Uterine fibroid 2010    ENDOMETRIOSIS    Vitamin D deficiency 2010       Past Surgical History:   Procedure Laterality Date    HX APPENDECTOMY      HX  SECTION  ,     HX GYN  16    hysterectomy, TUBES REMOVED  (DR DORIAN [facility]SARI[facility])    HX HYSTERECTOMY  2016    HX OOPHORECTOMY Bilateral 2016    HX OTHER SURGICAL      small bowel obstruction, ILEUS POSTOP    HX VASCULAR ACCESS      PORT A CATH L CHEST WALL    HX WISDOM TEETH EXTRACTION      age 28    IA ABDOMEN SURGERY PROC UNLISTED  2016    EXP LAP FOR BOWEL OBST. Family History   Problem Relation Age of Onset    Diabetes Mother     Diabetes Father     Hypertension Father     Diabetes Maternal Grandmother     Diabetes Maternal Grandfather     No Known Problems Sister     Diabetes Paternal Uncle     Anesth Problems Neg Hx        Social History     Socioeconomic History    Marital status:      Spouse name: Not on file    Number of children: Not on file    Years of education: Not on file    Highest education level: Not on file   Occupational History    Not on file   Tobacco Use    Smoking status: Never Smoker    Smokeless tobacco: Never Used   Vaping Use    Vaping Use: Never used   Substance and Sexual Activity    Alcohol use: No    Drug use: No    Sexual activity: Yes     Partners: Male   Other Topics Concern    Not on file   Social History Narrative    Not on file       No visits with results within 3 Month(s) from this visit. Latest known visit with results is:   Orders Only on 2021   Component Date Value Ref Range Status    Microalbumin,urine random 2021 1.27  MG/DL Final    No reference range has been established.  Creatinine, urine 2021 142.00  mg/dL Final    No reference range has been established.     Microalbumin/Creat ratio (mg/g cre* 12/03/2021 9  0 - 30 mg/g Final    Hemoglobin A1c 12/03/2021 6.2* 4.0 - 5.6 % Final    Comment: NEW METHOD PLEASE NOTE NEW REFERENCE RANGE  (NOTE)  HbA1C Interpretive Ranges  <5.7              Normal  5.7 - 6.4         Consider Prediabetes  >6.5              Consider Diabetes      Est. average glucose 12/03/2021 131  mg/dL Final    Sodium 12/03/2021 141  136 - 145 mmol/L Final    Potassium 12/03/2021 3.9  3.5 - 5.1 mmol/L Final    Chloride 12/03/2021 105  97 - 108 mmol/L Final    CO2 12/03/2021 30  21 - 32 mmol/L Final    Anion gap 12/03/2021 6  5 - 15 mmol/L Final    Glucose 12/03/2021 99  65 - 100 mg/dL Final    BUN 12/03/2021 12  6 - 20 MG/DL Final    Creatinine 12/03/2021 0.61  0.55 - 1.02 MG/DL Final    BUN/Creatinine ratio 12/03/2021 20  12 - 20   Final    GFR est AA 12/03/2021 >60  >60 ml/min/1.73m2 Final    GFR est non-AA 12/03/2021 >60  >60 ml/min/1.73m2 Final    Comment: Estimated GFR is calculated using the IDMS-traceable Modification of Diet in  Renal Disease (MDRD) Study equation, reported for both  Americans  (GFRAA) and non- Americans (GFRNA), and normalized to 1.73m2 body  surface area. The physician must decide which value applies to the patient.  Calcium 12/03/2021 9.0  8.5 - 10.1 MG/DL Final    Bilirubin, total 12/03/2021 0.4  0.2 - 1.0 MG/DL Final    ALT (SGPT) 12/03/2021 66  12 - 78 U/L Final    AST (SGOT) 12/03/2021 36  15 - 37 U/L Final    Alk. phosphatase 12/03/2021 65  45 - 117 U/L Final    Protein, total 12/03/2021 8.0  6.4 - 8.2 g/dL Final    Albumin 12/03/2021 3.9  3.5 - 5.0 g/dL Final    Globulin 12/03/2021 4.1* 2.0 - 4.0 g/dL Final    A-G Ratio 12/03/2021 1.0* 1.1 - 2.2   Final      Review of Systems   Constitutional: Negative for activity change, fatigue and unexpected weight change. HENT: Negative for congestion, hearing loss, rhinorrhea and sore throat. Eyes: Negative for discharge.    Respiratory: Negative for cough, chest tightness and shortness of breath. Cardiovascular: Negative for leg swelling. Gastrointestinal: Negative for abdominal pain, constipation and diarrhea. Genitourinary: Negative for dysuria, flank pain, frequency and urgency. Musculoskeletal: Negative for arthralgias, back pain and myalgias. Skin: Negative for color change and rash. Neurological: Negative for dizziness, light-headedness and headaches. Psychiatric/Behavioral: Negative for dysphoric mood and sleep disturbance. The patient is not nervous/anxious. Visit Vitals  /76 (BP 1 Location: Left upper arm, BP Patient Position: Sitting, BP Cuff Size: Adult)   Pulse 91   Temp 98 °F (36.7 °C) (Oral)   Resp 16   Ht 5' 1\" (1.549 m)   Wt 144 lb (65.3 kg)   LMP 07/05/2013   SpO2 100%   BMI 27.21 kg/m²      Physical Exam  Vitals and nursing note reviewed. Constitutional:       General: She is not in acute distress. Appearance: Normal appearance. She is well-developed. She is not diaphoretic. HENT:      Right Ear: External ear normal.      Left Ear: External ear normal.   Eyes:      General: No scleral icterus. Right eye: No discharge. Left eye: No discharge. Extraocular Movements: Extraocular movements intact. Conjunctiva/sclera: Conjunctivae normal.   Cardiovascular:      Rate and Rhythm: Normal rate and regular rhythm. Pulmonary:      Effort: Pulmonary effort is normal.      Breath sounds: Normal breath sounds. No wheezing. Abdominal:      General: Bowel sounds are normal.      Palpations: Abdomen is soft. Tenderness: There is no abdominal tenderness. Musculoskeletal:      Cervical back: Normal range of motion and neck supple. Lymphadenopathy:      Cervical: No cervical adenopathy. Neurological:      Mental Status: She is alert and oriented to person, place, and time.    Psychiatric:         Mood and Affect: Mood and affect normal.       An electronic signature was used to authenticate this note.   -- Alcira Lubin

## 2022-07-07 DIAGNOSIS — E11.65 TYPE 2 DIABETES MELLITUS WITH HYPERGLYCEMIA, WITH LONG-TERM CURRENT USE OF INSULIN (HCC): ICD-10-CM

## 2022-07-07 DIAGNOSIS — Z79.4 TYPE 2 DIABETES MELLITUS WITH HYPERGLYCEMIA, WITH LONG-TERM CURRENT USE OF INSULIN (HCC): ICD-10-CM

## 2022-07-07 RX ORDER — INSULIN ASPART 100 [IU]/ML
INJECTION, SOLUTION INTRAVENOUS; SUBCUTANEOUS
Qty: 15 ML | Refills: 1 | OUTPATIENT
Start: 2022-07-07

## 2022-08-17 ENCOUNTER — TELEPHONE (OUTPATIENT)
Dept: PRIMARY CARE CLINIC | Age: 52
End: 2022-08-17

## 2022-08-17 NOTE — TELEPHONE ENCOUNTER
Levon from [facility] said they were calling to check to see if we received a order for this patient  Phone - [phone]  Fax - [phone]   Reference # [id number]

## 2022-09-01 DIAGNOSIS — Z79.4 INSULIN DEPENDENT TYPE 2 DIABETES MELLITUS (HCC): ICD-10-CM

## 2022-09-01 DIAGNOSIS — E11.9 INSULIN DEPENDENT TYPE 2 DIABETES MELLITUS (HCC): ICD-10-CM

## 2022-09-01 RX ORDER — INSULIN DEGLUDEC 200 U/ML
INJECTION, SOLUTION SUBCUTANEOUS
Qty: 3 PEN | Refills: 0 | Status: SHIPPED | OUTPATIENT
Start: 2022-09-01

## 2022-09-01 NOTE — TELEPHONE ENCOUNTER
Requested Prescriptions     Pending Prescriptions Disp Refills    insulin degludec (TRESIBA) 200 unit/mL (3 mL) inpn pen 3 Pen 0     Si units daily.  90 day supply        Last Visit 22  Last Refill 22

## 2022-09-28 LAB — HBA1C MFR BLD HPLC: 4.8 %

## 2022-09-30 ENCOUNTER — TELEPHONE (OUTPATIENT)
Dept: PRIMARY CARE CLINIC | Age: 52
End: 2022-09-30

## 2022-09-30 NOTE — TELEPHONE ENCOUNTER
Patient said Karina Romo is sending a prior Authorization form for  Dexcom  glucose monitor.  Patient said she just wanted to let the office know

## 2022-10-14 DIAGNOSIS — Z79.4 TYPE 2 DIABETES MELLITUS WITH HYPERGLYCEMIA, WITH LONG-TERM CURRENT USE OF INSULIN (HCC): ICD-10-CM

## 2022-10-14 DIAGNOSIS — E11.65 TYPE 2 DIABETES MELLITUS WITH HYPERGLYCEMIA, WITH LONG-TERM CURRENT USE OF INSULIN (HCC): ICD-10-CM

## 2022-10-14 RX ORDER — BLOOD-GLUCOSE TRANSMITTER
EACH MISCELLANEOUS
Qty: 2 EACH | Refills: 3 | Status: SHIPPED | OUTPATIENT
Start: 2022-10-14 | End: 2022-10-26 | Stop reason: SDUPTHER

## 2022-10-14 NOTE — TELEPHONE ENCOUNTER
Patient wanted to have [address]  be delivered to the [facility] pharmacy. Patient would like a call back.    cell- [phone]  [place]- [phone]

## 2022-10-21 ENCOUNTER — TELEPHONE (OUTPATIENT)
Dept: PRIMARY CARE CLINIC | Age: 52
End: 2022-10-21

## 2022-10-26 DIAGNOSIS — Z79.4 TYPE 2 DIABETES MELLITUS WITH HYPERGLYCEMIA, WITH LONG-TERM CURRENT USE OF INSULIN (HCC): ICD-10-CM

## 2022-10-26 DIAGNOSIS — E11.65 TYPE 2 DIABETES MELLITUS WITH HYPERGLYCEMIA, WITH LONG-TERM CURRENT USE OF INSULIN (HCC): ICD-10-CM

## 2022-10-26 RX ORDER — BLOOD-GLUCOSE TRANSMITTER
EACH MISCELLANEOUS
Qty: 2 EACH | Refills: 3 | Status: SHIPPED | OUTPATIENT
Start: 2022-10-26

## 2022-10-26 RX ORDER — BLOOD-GLUCOSE SENSOR
EACH MISCELLANEOUS
Qty: 2 EACH | Refills: 3 | Status: SHIPPED | OUTPATIENT
Start: 2022-10-26

## 2022-10-28 RX ORDER — BLOOD-GLUCOSE,RECEIVER,CONT
EACH MISCELLANEOUS
Qty: 1 EACH | Refills: 2 | Status: SHIPPED | OUTPATIENT
Start: 2022-10-28

## 2022-11-19 DIAGNOSIS — Z79.4 INSULIN DEPENDENT TYPE 2 DIABETES MELLITUS (HCC): ICD-10-CM

## 2022-11-19 DIAGNOSIS — E11.9 INSULIN DEPENDENT TYPE 2 DIABETES MELLITUS (HCC): ICD-10-CM

## 2022-11-21 RX ORDER — INSULIN DEGLUDEC 200 U/ML
INJECTION, SOLUTION SUBCUTANEOUS
Qty: 3 PEN | Refills: 0 | Status: SHIPPED | OUTPATIENT
Start: 2022-11-21

## 2022-11-21 NOTE — TELEPHONE ENCOUNTER
PCP: Danyel Santillan MD    Last appt: 2022  Future Appointments   Date Time Provider Suyapa Das   1/3/2023 10:30 AM Danyel Santillan MD [facility]       Requested Prescriptions     Pending Prescriptions Disp Refills    insulin degludec (TRESIBA) 200 unit/mL (3 mL) inpn pen 3 Pen 0     Si units daily.  90 day supply         Other Comments:Last Fill-2022

## 2022-11-30 DIAGNOSIS — Z79.4 TYPE 2 DIABETES MELLITUS WITH HYPERGLYCEMIA, WITH LONG-TERM CURRENT USE OF INSULIN (HCC): ICD-10-CM

## 2022-11-30 DIAGNOSIS — E11.65 TYPE 2 DIABETES MELLITUS WITH HYPERGLYCEMIA, WITH LONG-TERM CURRENT USE OF INSULIN (HCC): ICD-10-CM

## 2022-12-01 RX ORDER — INSULIN ASPART 100 [IU]/ML
INJECTION, SOLUTION INTRAVENOUS; SUBCUTANEOUS
Qty: 3 ML | Refills: 1 | Status: SHIPPED | OUTPATIENT
Start: 2022-12-01

## 2022-12-07 ENCOUNTER — TELEPHONE (OUTPATIENT)
Dept: PRIMARY CARE CLINIC | Age: 52
End: 2022-12-07

## 2022-12-07 NOTE — TELEPHONE ENCOUNTER
Dr. Fauzia Gomez from [address] [place] [address] called about this mutual patient. Would like to speak with Habib to discuss care, but did not provide specific concerns.     Phone: [phone]

## 2022-12-29 ENCOUNTER — TELEPHONE (OUTPATIENT)
Dept: PRIMARY CARE CLINIC | Age: 52
End: 2022-12-29

## 2022-12-29 NOTE — TELEPHONE ENCOUNTER
----- Message from [facility] GEOVANI Graham sent at 12/28/2022  1:44 PM EST -----  Subject: Message to Provider    QUESTIONS  Information for Provider? The pt stated that she has an upcoming appt on   1/3/23 and would like to have bloodwork done prior to coming in for her   appt. Please follow up with pt to further assist.  ---------------------------------------------------------------------------  --------------  Nedra PAVON  [phone]; OK to leave message on voicemail  ---------------------------------------------------------------------------  --------------  SCRIPT ANSWERS  Relationship to Patient?  Self

## 2022-12-29 NOTE — TELEPHONE ENCOUNTER
Called- no answer. Patient is asking for labs.  Patient will have to wait until appointment as Dr Leia Mack is out of the office

## 2023-01-03 ENCOUNTER — OFFICE VISIT (OUTPATIENT)
Dept: PRIMARY CARE CLINIC | Age: 53
End: 2023-01-03
Payer: COMMERCIAL

## 2023-01-03 VITALS
RESPIRATION RATE: 16 BRPM | TEMPERATURE: 97.5 F | SYSTOLIC BLOOD PRESSURE: 114 MMHG | OXYGEN SATURATION: 97 % | WEIGHT: 144.8 LBS | HEART RATE: 97 BPM | BODY MASS INDEX: 27.34 KG/M2 | DIASTOLIC BLOOD PRESSURE: 74 MMHG | HEIGHT: 61 IN

## 2023-01-03 DIAGNOSIS — C80.0 CARCINOMATOSIS (HCC): ICD-10-CM

## 2023-01-03 DIAGNOSIS — E11.65 TYPE 2 DIABETES MELLITUS WITH HYPERGLYCEMIA, WITH LONG-TERM CURRENT USE OF INSULIN (HCC): Primary | ICD-10-CM

## 2023-01-03 DIAGNOSIS — C57.01: ICD-10-CM

## 2023-01-03 DIAGNOSIS — G62.0 PERIPHERAL NEUROPATHY DUE TO CHEMOTHERAPY (HCC): ICD-10-CM

## 2023-01-03 DIAGNOSIS — T45.1X5A PERIPHERAL NEUROPATHY DUE TO CHEMOTHERAPY (HCC): ICD-10-CM

## 2023-01-03 DIAGNOSIS — Z79.4 TYPE 2 DIABETES MELLITUS WITH HYPERGLYCEMIA, WITH LONG-TERM CURRENT USE OF INSULIN (HCC): Primary | ICD-10-CM

## 2023-01-03 PROBLEM — D70.1 CHEMOTHERAPY-INDUCED NEUTROPENIA (HCC): Status: ACTIVE | Noted: 2023-01-03

## 2023-01-03 PROBLEM — D69.6 THROMBOCYTOPENIA (HCC): Status: RESOLVED | Noted: 2023-01-03 | Resolved: 2023-01-03

## 2023-01-03 PROBLEM — D70.1 CHEMOTHERAPY-INDUCED NEUTROPENIA (HCC): Status: RESOLVED | Noted: 2023-01-03 | Resolved: 2023-01-03

## 2023-01-03 PROBLEM — D69.6 THROMBOCYTOPENIA (HCC): Status: ACTIVE | Noted: 2023-01-03

## 2023-01-03 LAB
ALBUMIN UR QL STRIP: 80 MG/L
CREATININE, URINE POC: 300 MG/DL
MICROALBUMIN/CREAT RATIO POC: NORMAL MG/G

## 2023-01-03 PROCEDURE — 82044 UR ALBUMIN SEMIQUANTITATIVE: CPT | Performed by: INTERNAL MEDICINE

## 2023-01-03 PROCEDURE — 99214 OFFICE O/P EST MOD 30 MIN: CPT | Performed by: INTERNAL MEDICINE

## 2023-01-03 RX ORDER — ROPINIROLE 0.25 MG/1
TABLET, FILM COATED ORAL
COMMUNITY
Start: 2022-12-07

## 2023-01-03 RX ORDER — OLANZAPINE 5 MG/1
5 TABLET ORAL
COMMUNITY
Start: 2022-12-05

## 2023-01-03 RX ORDER — DULOXETIN HYDROCHLORIDE 60 MG/1
60 CAPSULE, DELAYED RELEASE ORAL DAILY
COMMUNITY

## 2023-01-03 RX ORDER — DEXAMETHASONE 4 MG/1
TABLET ORAL EVERY 12 HOURS
COMMUNITY

## 2023-01-03 RX ORDER — PANTOPRAZOLE SODIUM 40 MG/1
TABLET, DELAYED RELEASE ORAL
COMMUNITY
Start: 2022-12-30

## 2023-01-03 RX ORDER — OXYCODONE HYDROCHLORIDE 5 MG/1
5 TABLET ORAL 2 TIMES DAILY
COMMUNITY

## 2023-01-03 RX ORDER — INSULIN DEGLUDEC 200 U/ML
INJECTION, SOLUTION SUBCUTANEOUS
Qty: 3 PEN | Refills: 1 | Status: SHIPPED | OUTPATIENT
Start: 2023-01-03

## 2023-01-03 RX ORDER — INSULIN ASPART 100 [IU]/ML
INJECTION, SOLUTION INTRAVENOUS; SUBCUTANEOUS
Qty: 3 ML | Refills: 1 | Status: SHIPPED | OUTPATIENT
Start: 2023-01-03

## 2023-01-03 NOTE — PROGRESS NOTES
Soraya Gisbon (: 1970) is a 46 y.o. female, established patient, here for evaluation of the following chief complaint(s):  Physical (Insulin concern)       ASSESSMENT/PLAN:  Below is the assessment and plan developed based on review of pertinent history, physical exam, labs, studies, and medications. 1. Type 2 diabetes mellitus with hyperglycemia, with long-term current use of insulin ([facility])  -      DIABETES FOOT EXAM  -     HEMOGLOBIN A1C WITH EAG; Future  -     METABOLIC PANEL, COMPREHENSIVE; Future  -     CBC W/O DIFF; Future  -     LIPID PANEL; Future  -     AMB POC URINE, MICROALBUMIN, SEMIQUANT (3 RESULTS)  -     insulin aspart U-100 (NovoLOG Flexpen U-100 Insulin) 100 unit/mL (3 mL) inpn; INJECT 14 UNITS BEFORE LUNCH AND DINNER., Normal, Disp-3 mL, R-1DX Code Needed  sent to pharmacy. -     insulin degludec (TRESIBA) 200 unit/mL (3 mL) inpn pen; 80 units daily. 90 day supply, Normal, Disp-3 Pen, R-1 sent to pharmacy. I ordered a CMP, CBC, lipid panel, and blood work to check her Hgb a1c. I ordered a urine sample to check her microalbumin. She should continue taking Tresiba 40 units in the morning and 40 units in the night. She should take 14 units Novolog insulin before lunch and 14 units before dinner. I refilled her prescription for Tresiba to be taken 40 units in the morning and 40 units in the evening. 2. Peripheral neuropathy due to chemotherapy ([facility] [address]  I recommend that she continue taking gabapentin and cymbalta. 3. Carcinomatosis ([facility] [address]  She is followed by Hematology/Oncology. She is considering joining clinical Trial.     4. Malignant tumor of fallopian tube, right [facility])  She is followed by Hematology/Oncology on weekly Chemotherapy. Neutropenia has improved. SUBJECTIVE/OBJECTIVE:  HPI  Patient presents today for a follow up. .  She is not fasting today but will have blood drawn after her infusion.     She states that her cancer has returned and she is having weekly chemotherapy. The cancer has spread to her liver and lymph nodes. She has become sensitive to some of the chemotherapy. She states that she is tired due to the chemotherapy. She has neuropathy and takes gabapentin and Cymbalta. She also eats frequently. Her BG runs around 122 but can run above 250 after she eats. She takes [place] 40 units of insulin in the morning, 40 units at night. She also has been taking Novolog 10 units before lunch and dinner. She reports that he BG has been running high. She has received COVID booster in April. She has had flu vaccine. She has not had the shingles vaccine. Patient Active Problem List   Diagnosis Code    AR (allergic rhinitis) J30.9    Hypercholesterolemia E78.00    S/P laparotomy Z98.890    Carcinomatosis ([facility] [address] C80.0    S/P hysterectomy with oophorectomy Z90.710, Z90.721    Malignant tumor of fallopian tube, right (HCC) C57.01    Peripheral neuropathy due to chemotherapy ([facility] [address] G62.0, T45.1X5A    Type 2 diabetes mellitus with hyperglycemia, with long-term current use of insulin ([facility]) E11.65, Z79.4        Current Outpatient Medications on File Prior to Visit   Medication Sig Dispense Refill    OLANZapine (ZyPREXA) 5 mg tablet Take 5 mg by mouth nightly. DULoxetine (Cymbalta) 60 mg capsule Take 60 mg by mouth daily. oxyCODONE IR (ROXICODONE) 5 mg immediate release tablet Take 5 mg by mouth two (2) times a day. dexAMETHasone (DECADRON) 4 mg tablet Take  by mouth every twelve (12) hours. Blood-Glucose Meter,Continuous (Dexcom G6 ) misc Use to check the blood sugars 2-3 times daily 1 Each 2    Blood-Glucose Transmitter (Dexcom G6 Transmitter) gerda Use to check your sugars 3-4 times daily 2 Each 3    Blood-Glucose Sensor (Dexcom G6 Sensor) gerda Use to check your sugars 3-4 times daily 2 Each 3    prochlorperazine (COMPAZINE) 10 mg tablet Take 5 mg by mouth every six (6) hours as needed for Nausea or Vomiting.       Insulin Needles, Disposable, 31 gauge x 5/16\" ndle Check blood sugar 5 times a day. 100 Pen Needle 11    lancets misc Use as directed. 1 Each 11    famotidine (PEPCID) 20 mg tablet 20 mg = 1 tab each dose, PO, bid, # 60 tab, 3 Refills, Pharmacy: [facility]/pharmacy #[id number]      folic acid (FOLVITE) 1 mg tablet Take  by mouth daily. cholecalciferol (VITAMIN D3) (1000 Units /25 mcg) tablet Take  by mouth daily. gabapentin (NEURONTIN) 600 mg tablet Take 600 mg by mouth daily. magnesium oxide 250 mg magnesium tablet Take 250 mg by mouth daily. ondansetron (ZOFRAN ODT) 8 mg disintegrating tablet       Blood-Glucose Meter monitoring kit Check blood sugar 3 times a day. 1 Kit 0    b complex vitamins tablet Take 1 Tab by mouth daily. CRANIAL PROSTHESIS misc Cranial Prosthesis  Diagnosis code  Cpt code 1 Each 2    rOPINIRole (REQUIP) 0.25 mg tablet       pantoprazole (PROTONIX) 40 mg tablet TAKE 1 TABLET BY MOUTH DAILY BEFORE BREAKFAST. DO NOT CRUSH, CHEW, OR SPLIT. [DISCONTINUED] insulin aspart U-100 (NovoLOG Flexpen U-100 Insulin) 100 unit/mL (3 mL) inpn INJECT 10 UNITS BEFORE LUNCH AND DINNER. 3 mL 1    [DISCONTINUED] insulin degludec (TRESIBA) 200 unit/mL (3 mL) inpn pen 80 units daily. 90 day supply 3 Pen 0    insulin aspart, niacinamide, 100 unit/mL (3 mL) inpn 10 units before each meals. (Patient not taking: Reported on 1/3/2023) 15 mL 1    aprepitant (EMEND) 80 mg capsule Take 80 mg by mouth daily. (Patient not taking: Reported on 1/3/2023)      glucose blood VI test strips (Accu-Chek Mirian Plus test strp) strip Use four times a day and PRN. (Patient not taking: Reported on 1/3/2023) 300 Strip 2    nortriptyline (PAMELOR) 25 mg capsule Take 25 mg by mouth nightly. (Patient not taking: Reported on 1/3/2023)      melatonin 5 mg tablet Take 5 mg by mouth nightly. (Patient not taking: Reported on 1/3/2023)      liposomal DOXOrubicin, DOXIL/LIPODOX, 2 mg/mL injection by IntraVENous route once.  (Patient not taking: No sig reported)      glucose blood VI test strips (ASCENSIA AUTODISC VI, ONE TOUCH ULTRA TEST VI) strip Glucose one touch test strips (Patient not taking: Reported on 1/3/2023) 100 Strip 11    lidocaine-prilocaine (EMLA) topical cream Apply small amount over port area and cover with band aid 1 hour before chemo (Patient not taking: No sig reported) 30 g 2     No current facility-administered medications on file prior to visit. Allergies   Allergen Reactions    Diphenhydramine-Acetaminophen Rash    Tylenol [Acetaminophen] Swelling     Tylenol pm only  SLURRED SPEECH. Past Medical History:   Diagnosis Date    AR (allergic rhinitis) 2010    Asthma 2010    ALLERGY RELATED; NONE USED SINCE     Cancer [facility])     OVARIAN AND FALLOPIAN TUBE    Cancer (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 DM mellitus, gestational 2010    PRE DIABETIC    Elevated TSH 2010    Family history of diabetes mellitus (DM)     History of ovarian cancer 2016    Hypercholesterolemia 2011    elevated particle number    Ill-defined condition 16    HYDRONEPHROSIS, LEFT RENAL MASS (BEING WORKED UP BY DR MC Jones.)    Impaired glucose tolerance 2011    Menopause 2016    Other activity     BCG vaccine as a child    S/P laparotomy 2016    Uterine fibroid 2010    ENDOMETRIOSIS    Vitamin D deficiency 2010       Past Surgical History:   Procedure Laterality Date    HX APPENDECTOMY      HX  SECTION  ,     HX GYN  16    hysterectomy, TUBES REMOVED  (DR DORIAN ANSARI--[facility])    HX HYSTERECTOMY  2016    HX OOPHORECTOMY Bilateral 2016    HX OTHER SURGICAL      small bowel obstruction, ILEUS POSTOP    HX VASCULAR ACCESS      PORT A CATH L CHEST WALL    HX WISDOM TEETH EXTRACTION      age 28    [place] UNLISTED [address]  2016    EXP LAP FOR BOWEL OBST.        Family History   Problem Relation Age of Onset Diabetes Mother     Diabetes Father     Hypertension Father     Diabetes Maternal Grandmother     Diabetes Maternal Grandfather     No Known Problems Sister     Diabetes Paternal Uncle     Anesth Problems Neg Hx        Social History     Socioeconomic History    Marital status:      Spouse name: Not on file    Number of children: Not on file    Years of education: Not on file    Highest education level: Not on file   Occupational History    Not on file   Tobacco Use    Smoking status: Never    Smokeless tobacco: Never   Vaping Use    Vaping Use: Never used   Substance and Sexual Activity    Alcohol use: No    Drug use: No    Sexual activity: Yes     Partners: Male   Other Topics Concern    Not on file   Social History Narrative    Not on file     Social Determinants of Health     Financial Resource Strain: Low Risk     Difficulty of Paying Living Expenses: Not hard at all   Food Insecurity: No Food Insecurity    Worried About Running Out of Food in the Last Year: Never true    Ran Out of Food in the Last Year: Never true   Transportation Needs: Not on file   Physical Activity: Not on file   Stress: Not on file   Social Connections: Not on file   Intimate Partner Violence: Not on file   Housing Stability: Not on file       Abstract on 10/26/2022   Component Date Value Ref Range Status    Hemoglobin A1c, External 09/28/2022 4.8  % Final    Care Everywhere-[facility]       Review of Systems   Constitutional:  Negative for activity change, fatigue and unexpected weight change. HENT:  Negative for congestion, hearing loss, rhinorrhea and sore throat. Eyes:  Negative for discharge. Respiratory:  Negative for cough, chest tightness and shortness of breath. Cardiovascular:  Negative for leg swelling. Gastrointestinal:  Negative for abdominal pain, constipation and diarrhea. Genitourinary:  Negative for dysuria, flank pain, frequency and urgency.    Musculoskeletal:  Negative for arthralgias, back pain and myalgias. Skin:  Negative for color change and rash. Neurological:  Negative for dizziness, light-headedness and headaches. Psychiatric/Behavioral:  Negative for dysphoric mood and sleep disturbance. The patient is not nervous/anxious. Visit Vitals  /74 (BP 1 Location: Right arm)   Pulse 97   Temp 97.5 °F (36.4 °C)   Resp 16   Ht 5' 1\" (1.549 m)   Wt 144 lb 12.8 oz (65.7 kg)   LMP 07/05/2013   SpO2 97%   BMI 27.36 kg/m²       Physical Exam  Vitals and nursing note reviewed. Constitutional:       General: She is not in acute distress. Appearance: Normal appearance. She is normal weight. She is not diaphoretic. HENT:      Right Ear: Tympanic membrane, ear canal and external ear normal.      Left Ear: Tympanic membrane, ear canal and external ear normal.      Mouth/Throat:      Mouth: Mucous membranes are moist.      Pharynx: Oropharynx is clear. Eyes:      General:         Right eye: No discharge. Left eye: No discharge. Extraocular Movements: Extraocular movements intact. Conjunctiva/sclera: Conjunctivae normal.   Neck:      Thyroid: No thyromegaly. Cardiovascular:      Rate and Rhythm: Normal rate and regular rhythm. Pulses: Normal pulses. Dorsalis pedis pulses are 2+ on the right side and 2+ on the left side. Pulmonary:      Effort: Pulmonary effort is normal.      Breath sounds: Normal breath sounds. No wheezing. Abdominal:      General: Bowel sounds are normal. There is no distension. Palpations: Abdomen is soft. Tenderness: There is no abdominal tenderness. Musculoskeletal:      Right lower leg: No edema. Left lower leg: No edema.       Comments: B/L knees without crepitus   Feet:      Comments: Diabetic foot exam:     Left: Vibratory sensation normal    Sharp/dull discrimination normal    Filament test normal sensation with micro filament   Pulse DP: 2+ (normal)   Deformities: None  Right: Vibratory sensation normal   Sharp/dull discrimination normal   Filament test normal sensation with micro filament   Pulse DP: 2+ (normal)   Deformities: None    Lymphadenopathy:      Cervical: No cervical adenopathy. Neurological:      Deep Tendon Reflexes:      Reflex Scores:       Patellar reflexes are 2+ on the right side and 2+ on the left side. Psychiatric:         Mood and Affect: Mood and affect normal.         I, Nancy Lyles MD, personally performed the services described in this documentation as scribed by Demetrius Elliott in my presence, and it is both accurate and complete. An electronic signature was used to authenticate this note.   -- Demetrius Elliott

## 2023-01-03 NOTE — PROGRESS NOTES
Chief Complaint   Patient presents with    Physical     Insulin concern       Visit Vitals  /74 (BP 1 Location: Right arm)   Pulse 97   Temp 97.5 °F (36.4 °C)   Resp 16   Ht 5' 1\" (1.549 m)   Wt 144 lb 12.8 oz (65.7 kg)   LMP 07/05/2013   SpO2 97%   BMI 27.36 kg/m²       1. Have you been to the ER, urgent care clinic since your last visit? Hospitalized since your last visit? Yes [facility] for Bowel obstruction and UTI    2. Have you seen or consulted any other health care providers outside of the [address] [place] [address] since your last visit? Include any pap smears or colon screening.  Yes [facility], Chemo treatments

## 2023-01-27 DIAGNOSIS — E11.9 CONTROLLED TYPE 2 DIABETES MELLITUS WITHOUT COMPLICATION, WITHOUT LONG-TERM CURRENT USE OF INSULIN (HCC): Primary | ICD-10-CM

## 2023-01-27 RX ORDER — METFORMIN HYDROCHLORIDE 1000 MG/1
1000 TABLET ORAL 2 TIMES DAILY WITH MEALS
Qty: 180 TABLET | Refills: 0 | Status: SHIPPED | OUTPATIENT
Start: 2023-01-27 | End: 2023-04-27

## 2023-02-19 DIAGNOSIS — Z79.4 TYPE 2 DIABETES MELLITUS WITH HYPERGLYCEMIA, WITH LONG-TERM CURRENT USE OF INSULIN (HCC): ICD-10-CM

## 2023-02-19 DIAGNOSIS — E11.65 TYPE 2 DIABETES MELLITUS WITH HYPERGLYCEMIA, WITH LONG-TERM CURRENT USE OF INSULIN (HCC): ICD-10-CM

## 2023-02-20 RX ORDER — BLOOD-GLUCOSE SENSOR
EACH MISCELLANEOUS
Qty: 2 EACH | Refills: 3 | Status: SHIPPED | OUTPATIENT
Start: 2023-02-20

## 2023-02-28 DIAGNOSIS — Z79.4 INSULIN DEPENDENT TYPE 2 DIABETES MELLITUS (HCC): Primary | ICD-10-CM

## 2023-02-28 DIAGNOSIS — E11.65 TYPE 2 DIABETES MELLITUS WITH HYPERGLYCEMIA, WITH LONG-TERM CURRENT USE OF INSULIN (HCC): ICD-10-CM

## 2023-02-28 DIAGNOSIS — E11.9 INSULIN DEPENDENT TYPE 2 DIABETES MELLITUS (HCC): Primary | ICD-10-CM

## 2023-02-28 DIAGNOSIS — Z79.4 TYPE 2 DIABETES MELLITUS WITH HYPERGLYCEMIA, WITH LONG-TERM CURRENT USE OF INSULIN (HCC): ICD-10-CM

## 2023-02-28 RX ORDER — INSULIN ASPART 100 [IU]/ML
INJECTION, SOLUTION INTRAVENOUS; SUBCUTANEOUS
Qty: 3 ML | Refills: 1 | Status: SHIPPED | OUTPATIENT
Start: 2023-02-28

## 2023-03-02 DIAGNOSIS — Z79.4 INSULIN DEPENDENT TYPE 2 DIABETES MELLITUS (HCC): ICD-10-CM

## 2023-03-02 DIAGNOSIS — E11.9 INSULIN DEPENDENT TYPE 2 DIABETES MELLITUS (HCC): ICD-10-CM

## 2023-03-02 NOTE — TELEPHONE ENCOUNTER
Patient needs 90 days supply of Novolog for insurance.  The dispense needs to be for 45 for the insurance to cover some of the cost right now patient is say she has to pay 500

## 2023-03-08 RX ORDER — INSULIN ASPART 100 [IU]/ML
INJECTION, SOLUTION INTRAVENOUS; SUBCUTANEOUS
Qty: 45 ML | Refills: 0 | Status: SHIPPED | OUTPATIENT
Start: 2023-03-08

## 2023-03-09 ENCOUNTER — TELEPHONE (OUTPATIENT)
Dept: PRIMARY CARE CLINIC | Age: 53
End: 2023-03-09

## 2023-03-09 NOTE — TELEPHONE ENCOUNTER
Patient was transferred over from [facility] ([facility]) as urgent,  needing to seen today or tomorrow. Patient said she has ring worm on her hand and her head. Patient said she got it from her kids.  Patient she would like a call back at [phone]

## 2023-03-10 ENCOUNTER — VIRTUAL VISIT (OUTPATIENT)
Dept: PRIMARY CARE CLINIC | Age: 53
End: 2023-03-10
Payer: COMMERCIAL

## 2023-03-10 DIAGNOSIS — C80.0 CARCINOMATOSIS (HCC): Primary | ICD-10-CM

## 2023-03-10 DIAGNOSIS — E11.65 TYPE 2 DIABETES MELLITUS WITH HYPERGLYCEMIA, WITH LONG-TERM CURRENT USE OF INSULIN (HCC): ICD-10-CM

## 2023-03-10 DIAGNOSIS — B35.0 SCALP RINGWORM: ICD-10-CM

## 2023-03-10 DIAGNOSIS — Z79.4 TYPE 2 DIABETES MELLITUS WITH HYPERGLYCEMIA, WITH LONG-TERM CURRENT USE OF INSULIN (HCC): ICD-10-CM

## 2023-03-10 PROCEDURE — 99214 OFFICE O/P EST MOD 30 MIN: CPT | Performed by: INTERNAL MEDICINE

## 2023-03-10 PROCEDURE — 3044F HG A1C LEVEL LT 7.0%: CPT | Performed by: INTERNAL MEDICINE

## 2023-03-10 RX ORDER — CLOTRIMAZOLE AND BETAMETHASONE DIPROPIONATE 10; .64 MG/G; MG/G
CREAM TOPICAL
Qty: 45 G | Refills: 0 | Status: SHIPPED | OUTPATIENT
Start: 2023-03-10

## 2023-03-10 NOTE — TELEPHONE ENCOUNTER
Spoke to pt and asked if she can do a VV today at 2:15pm with Dr. Manuela Le. She said yes and she is in the state of [place].

## 2023-03-23 DIAGNOSIS — Z79.4 TYPE 2 DIABETES MELLITUS WITH HYPERGLYCEMIA, WITH LONG-TERM CURRENT USE OF INSULIN (HCC): ICD-10-CM

## 2023-03-23 DIAGNOSIS — E11.65 TYPE 2 DIABETES MELLITUS WITH HYPERGLYCEMIA, WITH LONG-TERM CURRENT USE OF INSULIN (HCC): ICD-10-CM

## 2023-03-23 NOTE — TELEPHONE ENCOUNTER
Looks like the patient has 2 refills but [facility] is telling her she has none left. She is requesting a 90 day prescription.

## 2023-03-24 RX ORDER — BLOOD-GLUCOSE SENSOR
EACH MISCELLANEOUS
Qty: 2 EACH | Refills: 3 | Status: SHIPPED | OUTPATIENT
Start: 2023-03-24

## 2023-04-14 ENCOUNTER — TELEPHONE (OUTPATIENT)
Dept: PRIMARY CARE CLINIC | Age: 53
End: 2023-04-14

## 2023-04-29 DIAGNOSIS — E11.9 CONTROLLED TYPE 2 DIABETES MELLITUS WITHOUT COMPLICATION, WITHOUT LONG-TERM CURRENT USE OF INSULIN (HCC): ICD-10-CM

## 2023-04-29 RX ORDER — METFORMIN HYDROCHLORIDE 1000 MG/1
1000 TABLET ORAL 2 TIMES DAILY WITH MEALS
Qty: 180 TABLET | Refills: 0 | Status: SHIPPED | OUTPATIENT
Start: 2023-04-29 | End: 2023-07-28

## 2023-05-11 DIAGNOSIS — B35.0 TINEA BARBAE AND TINEA CAPITIS: ICD-10-CM

## 2023-05-12 RX ORDER — CLOTRIMAZOLE AND BETAMETHASONE DIPROPIONATE 10; .64 MG/G; MG/G
CREAM TOPICAL
Qty: 45 G | Refills: 0 | Status: SHIPPED | OUTPATIENT
Start: 2023-05-12

## 2023-05-19 RX ORDER — FLUCONAZOLE 150 MG/1
150 TABLET ORAL ONCE
Qty: 3 TABLET | Refills: 0 | Status: SHIPPED | OUTPATIENT
Start: 2023-05-19 | End: 2023-05-19

## 2023-05-19 RX ORDER — FLUCONAZOLE 150 MG/1
TABLET ORAL
COMMUNITY
Start: 2023-04-14 | End: 2023-05-19 | Stop reason: SDUPTHER

## 2023-05-24 ENCOUNTER — TELEPHONE (OUTPATIENT)
Dept: PRIMARY CARE CLINIC | Facility: CLINIC | Age: 53
End: 2023-05-24

## 2023-05-24 NOTE — TELEPHONE ENCOUNTER
Patient will be travelling and wants Dr Zack Kennedy to write her a note saying she's a diabetic and how often and how much insulin she takes.

## 2023-07-27 DIAGNOSIS — E11.9 TYPE 2 DIABETES MELLITUS WITHOUT COMPLICATIONS (HCC): ICD-10-CM

## 2023-07-27 DIAGNOSIS — B35.0 TINEA BARBAE AND TINEA CAPITIS: ICD-10-CM

## 2023-07-31 RX ORDER — CLOTRIMAZOLE AND BETAMETHASONE DIPROPIONATE 10; .64 MG/G; MG/G
CREAM TOPICAL
Qty: 45 G | Refills: 0 | Status: SHIPPED | OUTPATIENT
Start: 2023-07-31

## 2023-08-02 ENCOUNTER — OFFICE VISIT (OUTPATIENT)
Dept: PRIMARY CARE CLINIC | Facility: CLINIC | Age: 53
End: 2023-08-02
Payer: COMMERCIAL

## 2023-08-02 ENCOUNTER — NURSE TRIAGE (OUTPATIENT)
Dept: OTHER | Facility: CLINIC | Age: 53
End: 2023-08-02

## 2023-08-02 ENCOUNTER — TELEPHONE (OUTPATIENT)
Dept: PRIMARY CARE CLINIC | Facility: CLINIC | Age: 53
End: 2023-08-02

## 2023-08-02 VITALS
HEART RATE: 100 BPM | DIASTOLIC BLOOD PRESSURE: 70 MMHG | RESPIRATION RATE: 20 BRPM | BODY MASS INDEX: 24.43 KG/M2 | OXYGEN SATURATION: 99 % | SYSTOLIC BLOOD PRESSURE: 100 MMHG | TEMPERATURE: 97 F | WEIGHT: 129.4 LBS | HEIGHT: 61 IN

## 2023-08-02 DIAGNOSIS — E11.9 TYPE 2 DIABETES MELLITUS WITHOUT COMPLICATION, WITH LONG-TERM CURRENT USE OF INSULIN (HCC): ICD-10-CM

## 2023-08-02 DIAGNOSIS — K56.609 SBO (SMALL BOWEL OBSTRUCTION) (HCC): ICD-10-CM

## 2023-08-02 DIAGNOSIS — R21 EXCORIATED RASH: ICD-10-CM

## 2023-08-02 DIAGNOSIS — Z79.4 TYPE 2 DIABETES MELLITUS WITHOUT COMPLICATION, WITH LONG-TERM CURRENT USE OF INSULIN (HCC): ICD-10-CM

## 2023-08-02 DIAGNOSIS — E16.2 MULTIPLE EPISODES OF HYPOGLYCEMIA: ICD-10-CM

## 2023-08-02 DIAGNOSIS — E83.42 HYPOMAGNESEMIA: ICD-10-CM

## 2023-08-02 DIAGNOSIS — C56.9 MALIGNANT NEOPLASM OF OVARY, UNSPECIFIED LATERALITY (HCC): Primary | ICD-10-CM

## 2023-08-02 PROCEDURE — 3044F HG A1C LEVEL LT 7.0%: CPT | Performed by: INTERNAL MEDICINE

## 2023-08-02 PROCEDURE — 99214 OFFICE O/P EST MOD 30 MIN: CPT | Performed by: INTERNAL MEDICINE

## 2023-08-02 RX ORDER — TRIAMCINOLONE ACETONIDE 0.25 MG/G
OINTMENT TOPICAL
Qty: 80 G | Refills: 1 | Status: SHIPPED | OUTPATIENT
Start: 2023-08-02 | End: 2023-08-09

## 2023-08-02 RX ORDER — ACYCLOVIR 400 MG/1
TABLET ORAL
Qty: 1 EACH | Refills: 3 | Status: SHIPPED | OUTPATIENT
Start: 2023-08-02

## 2023-08-02 RX ORDER — ACYCLOVIR 400 MG/1
TABLET ORAL
Qty: 1 EACH | Refills: 4 | Status: SHIPPED | OUTPATIENT
Start: 2023-08-02

## 2023-08-02 SDOH — ECONOMIC STABILITY: FOOD INSECURITY: WITHIN THE PAST 12 MONTHS, THE FOOD YOU BOUGHT JUST DIDN'T LAST AND YOU DIDN'T HAVE MONEY TO GET MORE.: PATIENT DECLINED

## 2023-08-02 SDOH — ECONOMIC STABILITY: FOOD INSECURITY: WITHIN THE PAST 12 MONTHS, YOU WORRIED THAT YOUR FOOD WOULD RUN OUT BEFORE YOU GOT MONEY TO BUY MORE.: PATIENT DECLINED

## 2023-08-02 SDOH — ECONOMIC STABILITY: HOUSING INSECURITY
IN THE LAST 12 MONTHS, WAS THERE A TIME WHEN YOU DID NOT HAVE A STEADY PLACE TO SLEEP OR SLEPT IN A SHELTER (INCLUDING NOW)?: NO

## 2023-08-02 SDOH — ECONOMIC STABILITY: INCOME INSECURITY: HOW HARD IS IT FOR YOU TO PAY FOR THE VERY BASICS LIKE FOOD, HOUSING, MEDICAL CARE, AND HEATING?: PATIENT DECLINED

## 2023-08-02 ASSESSMENT — PATIENT HEALTH QUESTIONNAIRE - PHQ9
2. FEELING DOWN, DEPRESSED OR HOPELESS: 0
SUM OF ALL RESPONSES TO PHQ9 QUESTIONS 1 & 2: 0
SUM OF ALL RESPONSES TO PHQ QUESTIONS 1-9: 0
1. LITTLE INTEREST OR PLEASURE IN DOING THINGS: 0
SUM OF ALL RESPONSES TO PHQ QUESTIONS 1-9: 0

## 2023-08-02 ASSESSMENT — ENCOUNTER SYMPTOMS
BACK PAIN: 0
ABDOMINAL PAIN: 0
COUGH: 0
SORE THROAT: 0
CONSTIPATION: 0
DIARRHEA: 0
RHINORRHEA: 0
CHEST TIGHTNESS: 0
EYE DISCHARGE: 0
COLOR CHANGE: 0
SHORTNESS OF BREATH: 0

## 2023-08-02 NOTE — TELEPHONE ENCOUNTER
[facility]    [facility] Transfer from [facility] with [employer]. Subjective: Caller states \"I had surgery on the 22nd. I'm a Diabetic also. My sugar has been like 60. In the morning it's going way down. In the hospital they changed my Insulin dose. \"     Current Symptoms: low blood sugars- 48-60s at night/mornings, 170 NOW, bilateral leg cramping/abdominal cramping (discussed with Oncologist d/t low Mg), nausea    Surgery on 7/22-ovarian cancer and bowel obstruction- ostomy surgery; Insulin adjusted in hospital d/t hyperglycemia    Hx of Low Magnesium-1.4    Onset: 4 days ago; waxing and waning    Associated Symptoms: reduced activity    Pain Severity: 6/10; cramping, throbbing; constant    Temperature: Denies    What has been tried: Glucose tablet-helps, sugar-helps, Pedialyte, Ibuprofen    LMP: NA Pregnant: NA    Recommended disposition: See in Office Today. Patient agreeable. Care advice provided, patient verbalizes understanding; denies any other questions or concerns; instructed to call back for any new or worsening symptoms. Patient/Caller agrees with recommended disposition; writer provided warm transfer to [facility] at [facility] for appointment scheduling. Attention Provider: Thank you for allowing me to participate in the care of your patient. The patient was connected to triage in response to information provided to the ECC/PSC. Please do not respond through this encounter as the response is not directed to a shared pool.       Reason for Disposition   Patient wants to be seen    Protocols used: Diabetes - Low Blood Sugar-ADULT-[place]

## 2023-08-02 NOTE — PROGRESS NOTES
1. \"Have you been to the ER, urgent care clinic since your last visit? Hospitalized since your last visit? \" Yes When: ER lots of visits. 2. \"Have you seen or consulted any other health care providers outside of the [address] [place] [address] since your last visit? \" Yes When: GI      3. For patients aged 43-73: Has the patient had a colonoscopy / FIT/ Cologuard? Yes - no Care Gap present      If the patient is female:    4. For patients aged 43-66: Has the patient had a mammogram within the past 2 years? Yes - no Care Gap present      5. For patients aged 21-65: Has the patient had a pap smear? NA - based on age or sex     Chief Complaint   Patient presents with    Follow-Up from [facility] to ER for bowel obstruction and now has colostomy bag. Blood Sugar Problem     Sugars have been low in the 60s. Tinea     Ring worm, not going away. Pt is ok with scribe.
mmol/L Final    Anion Gap 01/26/2023 3 (L)  5 - 15 mmol/L Final    Glucose 01/26/2023 163 (H)  65 - 100 mg/dL Final    BUN 01/26/2023 15  6 - 20 MG/DL Final    Creatinine 01/26/2023 0.57  0.55 - 1.02 MG/DL Final    Bun/Cre Ratio 01/26/2023 26 (H)  12 - 20   Final    ESTIMATED GLOMERULAR FILTRATION RA* 01/26/2023 >60  >60 ml/min/1.73m2 Final    Comment:   Pediatric calculator link: [employer].at. org/professionals/kdoqi/gfr_calculatorped    These results are not intended for use in patients <25years of age. eGFR results are calculated without a race factor using  the 2021 CKD-EPI equation. Careful clinical correlation is recommended, particularly when comparing to results calculated using previous equations. The CKD-EPI equation is less accurate in patients with extremes of muscle mass, extra-renal metabolism of creatinine, excessive creatine ingestion, or following therapy that affects renal tubular secretion. Calcium 01/26/2023 8.8  8.5 - 10.1 MG/DL Final    Total Bilirubin 01/26/2023 0.4  0.2 - 1.0 MG/DL Final    ALT 01/26/2023 37  12 - 78 U/L Final    AST 01/26/2023 24  15 - 37 U/L Final    Alkaline Phosphatase 01/26/2023 73  45 - 117 U/L Final    Total Protein 01/26/2023 6.9  6.4 - 8.2 g/dL Final    Albumin 01/26/2023 3.7  3.5 - 5.0 g/dL Final    Globulin 01/26/2023 3.2  2.0 - 4.0 g/dL Final    Albumin/Globulin Ratio 01/26/2023 1.2  1.1 - 2.2   Final    Cholesterol, Total 01/26/2023 151  <200 MG/DL Final    Triglycerides 01/26/2023 337 (H)  <150 MG/DL Final    Based on NCEP-ATP III:  Triglycerides <150 mg/dL  is considered normal, 150-199 mg/dL  borderline high,  200-499 mg/dL high and  greater than or equal to 500 mg/dL very high. HDL 01/26/2023 28  MG/DL Final    Based on NCEP ATP III, HDL Cholesterol <40 mg/dL is considered low and >60 mg/dL is elevated.     LDL Calculated 01/26/2023 55.6  0 - 100 MG/DL Final    Comment: Based on the NCEP-ATP: LDL-C concentrations are considered  optimal <100

## 2023-08-18 ENCOUNTER — TELEPHONE (OUTPATIENT)
Dept: PRIMARY CARE CLINIC | Facility: CLINIC | Age: 53
End: 2023-08-18

## 2023-08-18 DIAGNOSIS — E11.9 TYPE 2 DIABETES MELLITUS WITHOUT COMPLICATION, WITH LONG-TERM CURRENT USE OF INSULIN (HCC): ICD-10-CM

## 2023-08-18 DIAGNOSIS — Z79.4 TYPE 2 DIABETES MELLITUS WITHOUT COMPLICATION, WITH LONG-TERM CURRENT USE OF INSULIN (HCC): ICD-10-CM

## 2023-08-22 DIAGNOSIS — E11.65 TYPE 2 DIABETES MELLITUS WITH HYPERGLYCEMIA (HCC): ICD-10-CM

## 2023-08-23 ENCOUNTER — TELEPHONE (OUTPATIENT)
Dept: PRIMARY CARE CLINIC | Facility: CLINIC | Age: 53
End: 2023-08-23

## 2023-08-23 RX ORDER — PEN NEEDLE, DIABETIC 31 GX5/16"
NEEDLE, DISPOSABLE MISCELLANEOUS
Qty: 100 EACH | Refills: 11 | Status: SHIPPED | OUTPATIENT
Start: 2023-08-23

## 2023-08-23 NOTE — TELEPHONE ENCOUNTER
Called and spoke with the insurance got it to be an over ride.  Called pharmacy and went through- notified the patient

## 2023-08-23 NOTE — TELEPHONE ENCOUNTER
I called and spoke with the pharmacy. I will have to call the insurance. The reading is stating that it has reached the limit- she has never received this ever. Called the patient- she is sending me her insurance card- I dont think that the [place] is correct.

## 2023-10-31 DIAGNOSIS — E11.9 TYPE 2 DIABETES MELLITUS WITHOUT COMPLICATIONS (HCC): ICD-10-CM

## 2024-06-10 ENCOUNTER — TELEPHONE (OUTPATIENT)
Dept: PRIMARY CARE CLINIC | Facility: CLINIC | Age: 54
End: 2024-06-10

## 2024-06-10 NOTE — TELEPHONE ENCOUNTER
Called pt and phone went straight to voicemail. I left a message stating if she can call back to make a follow up kwadwo with Dr. Montaño or make the kwadwo on [employer]. Thank you.